# Patient Record
Sex: MALE | Race: WHITE | Employment: FULL TIME | URBAN - NONMETROPOLITAN AREA
[De-identification: names, ages, dates, MRNs, and addresses within clinical notes are randomized per-mention and may not be internally consistent; named-entity substitution may affect disease eponyms.]

---

## 2018-07-20 ENCOUNTER — APPOINTMENT (OUTPATIENT)
Dept: LAB | Facility: MEDICAL CENTER | Age: 38
End: 2018-07-20

## 2018-07-20 ENCOUNTER — OFFICE VISIT (OUTPATIENT)
Dept: FAMILY MEDICINE CLINIC | Facility: CLINIC | Age: 38
End: 2018-07-20
Payer: COMMERCIAL

## 2018-07-20 VITALS
HEIGHT: 74 IN | BODY MASS INDEX: 37.6 KG/M2 | WEIGHT: 293 LBS | DIASTOLIC BLOOD PRESSURE: 80 MMHG | SYSTOLIC BLOOD PRESSURE: 128 MMHG

## 2018-07-20 DIAGNOSIS — G47.39 OTHER SLEEP APNEA: ICD-10-CM

## 2018-07-20 DIAGNOSIS — Z00.00 HEALTHCARE MAINTENANCE: Primary | ICD-10-CM

## 2018-07-20 DIAGNOSIS — Z13.6 SCREENING FOR CARDIOVASCULAR CONDITION: ICD-10-CM

## 2018-07-20 DIAGNOSIS — I83.813 VARICOSE VEINS OF BOTH LOWER EXTREMITIES WITH PAIN: ICD-10-CM

## 2018-07-20 PROBLEM — R06.83 SNORES: Status: ACTIVE | Noted: 2018-07-20

## 2018-07-20 PROBLEM — R06.83 SNORES: Status: RESOLVED | Noted: 2018-07-20 | Resolved: 2018-07-20

## 2018-07-20 LAB
ALBUMIN SERPL BCP-MCNC: 4 G/DL (ref 3.5–5)
ALP SERPL-CCNC: 67 U/L (ref 46–116)
ALT SERPL W P-5'-P-CCNC: 55 U/L (ref 12–78)
ANION GAP SERPL CALCULATED.3IONS-SCNC: 8 MMOL/L (ref 4–13)
AST SERPL W P-5'-P-CCNC: 26 U/L (ref 5–45)
BASOPHILS # BLD AUTO: 0.04 THOUSANDS/ΜL (ref 0–0.1)
BASOPHILS NFR BLD AUTO: 1 % (ref 0–1)
BILIRUB SERPL-MCNC: 0.75 MG/DL (ref 0.2–1)
BUN SERPL-MCNC: 17 MG/DL (ref 5–25)
CALCIUM SERPL-MCNC: 9.1 MG/DL (ref 8.3–10.1)
CHLORIDE SERPL-SCNC: 104 MMOL/L (ref 100–108)
CHOLEST SERPL-MCNC: 194 MG/DL (ref 50–200)
CO2 SERPL-SCNC: 25 MMOL/L (ref 21–32)
CREAT SERPL-MCNC: 1.06 MG/DL (ref 0.6–1.3)
EOSINOPHIL # BLD AUTO: 0.13 THOUSAND/ΜL (ref 0–0.61)
EOSINOPHIL NFR BLD AUTO: 2 % (ref 0–6)
ERYTHROCYTE [DISTWIDTH] IN BLOOD BY AUTOMATED COUNT: 12.7 % (ref 11.6–15.1)
GFR SERPL CREATININE-BSD FRML MDRD: 89 ML/MIN/1.73SQ M
GLUCOSE P FAST SERPL-MCNC: 127 MG/DL (ref 65–99)
HCT VFR BLD AUTO: 46.9 % (ref 36.5–49.3)
HDLC SERPL-MCNC: 43 MG/DL (ref 40–60)
HGB BLD-MCNC: 15.4 G/DL (ref 12–17)
IMM GRANULOCYTES # BLD AUTO: 0.02 THOUSAND/UL (ref 0–0.2)
IMM GRANULOCYTES NFR BLD AUTO: 0 % (ref 0–2)
LDLC SERPL CALC-MCNC: 114 MG/DL (ref 0–100)
LYMPHOCYTES # BLD AUTO: 2.99 THOUSANDS/ΜL (ref 0.6–4.47)
LYMPHOCYTES NFR BLD AUTO: 36 % (ref 14–44)
MCH RBC QN AUTO: 30.8 PG (ref 26.8–34.3)
MCHC RBC AUTO-ENTMCNC: 32.8 G/DL (ref 31.4–37.4)
MCV RBC AUTO: 94 FL (ref 82–98)
MONOCYTES # BLD AUTO: 0.69 THOUSAND/ΜL (ref 0.17–1.22)
MONOCYTES NFR BLD AUTO: 8 % (ref 4–12)
NEUTROPHILS # BLD AUTO: 4.45 THOUSANDS/ΜL (ref 1.85–7.62)
NEUTS SEG NFR BLD AUTO: 53 % (ref 43–75)
NONHDLC SERPL-MCNC: 151 MG/DL
NRBC BLD AUTO-RTO: 0 /100 WBCS
PLATELET # BLD AUTO: 210 THOUSANDS/UL (ref 149–390)
PMV BLD AUTO: 10.2 FL (ref 8.9–12.7)
POTASSIUM SERPL-SCNC: 4.2 MMOL/L (ref 3.5–5.3)
PROT SERPL-MCNC: 7.6 G/DL (ref 6.4–8.2)
RBC # BLD AUTO: 5 MILLION/UL (ref 3.88–5.62)
SODIUM SERPL-SCNC: 137 MMOL/L (ref 136–145)
TRIGL SERPL-MCNC: 184 MG/DL
WBC # BLD AUTO: 8.32 THOUSAND/UL (ref 4.31–10.16)

## 2018-07-20 PROCEDURE — 99385 PREV VISIT NEW AGE 18-39: CPT | Performed by: FAMILY MEDICINE

## 2018-07-20 PROCEDURE — 80053 COMPREHEN METABOLIC PANEL: CPT

## 2018-07-20 PROCEDURE — 36415 COLL VENOUS BLD VENIPUNCTURE: CPT

## 2018-07-20 PROCEDURE — 80061 LIPID PANEL: CPT

## 2018-07-20 PROCEDURE — 85025 COMPLETE CBC W/AUTO DIFF WBC: CPT

## 2018-07-20 NOTE — PATIENT INSTRUCTIONS
Thank you for enrolling in [facility]  Please follow the instructions below to securely access your online medical record  [employer] allows you to send messages to your doctor, view your test results, renew your prescriptions, schedule appointments, and more  [facility] uses Single Sign on (SSO) Technology for you to log in and access our [address]  [employer], including [employer]  No more remembering multiple user names and passwords! We are going to guide you through, step by step, to help you set up your [address] [place] [address] account which will provide access to your [employer] account  How Do I Sign Up? 1  In your Internet browser, go to [email or web address]       2  Click on the [facility] patient account and then click Dont have an                 Account? Create one now      3  Enter your demographic information and chose a user name (email address) and password  Think of one that is secure and easy to remember  Enter a Referral code if you have one (this is not your MyChart code ) Accept the Terms and Conditions and the Privacy Policy  4  Select your security questions that you will use to reset your password should you forget it  Click Submit  5  Enter your [employer] Activation Code exactly as it appears below  You will not need to use this code after you have completed the sign-up process  If you do not sign up before the expiration date, you must request a new code  [employer] Activation Code: [email or web address]  Expires: 8/3/2018  9:39 AM    6  Confirm your email address  An email confirmation was sent to you  Please open that email and click Confirm your Email   You should then be redirected to our [employer] [place] [facility] Single sign on page, where you will log on with the user name and password you created! Proceed to the [employer] Icon to view your personal health information          Additional Information  If you have questions, you can e-mail patient  [email or web address] or call [phone] to talk to our customer support staff  Remember, MyChart is NOT to be used for urgent needs  For medical emergencies, dial 911

## 2018-07-20 NOTE — PROGRESS NOTES
Assessment/Plan: For his varicose veins, Rx for compression stockings  We will get blood work on him  We will order home sleep study on him  Follow-up in 4-6 months or p r n  No problem-specific Assessment & Plan notes found for this encounter  Diagnoses and all orders for this visit:    Healthcare maintenance    Other sleep apnea  -     Home Study; Future    Varicose veins of both lower extremities with pain  -     Compression Stocking    Screening for cardiovascular condition  -     CBC and differential; Future  -     Comprehensive metabolic panel; Future  -     Lipid panel; Future          Subjective:      Patient ID: Heike Maya is a 41 y o  male  New patient  Here today for well visit  Patient is on no chronic medications  Denies any chest pain or shortness of breath  Couple concerns is that 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, 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  Because of this he has varicose veins that are painful at times  Also his family states that he snores and stops breathing when he sleeps  The following portions of the patient's history were reviewed and updated as appropriate:   He  has no past medical history on file  He   Patient Active Problem List    Diagnosis Date Noted    Varicose veins of both lower extremities with pain 07/20/2018    Other sleep apnea 07/20/2018     He  has a past surgical history that includes Knee surgery  His family history includes No Known Problems in his father and mother  He  reports that he has been smoking  He has never used smokeless tobacco  He reports that he does not drink alcohol or use drugs  No current outpatient prescriptions on file  No current facility-administered medications for this visit  No current outpatient prescriptions on file prior to visit  No current facility-administered medications on file prior to visit  He has No Known Allergies       Review of Systems   Constitutional: Negative      Respiratory: Negative  Cardiovascular: Negative  Gastrointestinal: Negative  Genitourinary: Negative  Musculoskeletal:        Varicose veins   Psychiatric/Behavioral: Positive for sleep disturbance (Snoring, witnessed apnea)  Objective:      /80   Ht 6' 1 75" (1 873 m)   Wt 133 kg (293 lb)   BMI 37 87 kg/m²          Physical Exam   Constitutional: He is oriented to person, place, and time  He appears well-developed and well-nourished  No distress  Cardiovascular: Normal rate, regular rhythm and normal heart sounds  Exam reveals no gallop and no friction rub  No murmur heard  Pulmonary/Chest: Effort normal and breath sounds normal  No respiratory distress  He has no wheezes  He has no rales  Musculoskeletal: He exhibits no edema  Positive varicose veins bilateral lower extremities  Neurological: He is alert and oriented to person, place, and time  Skin: He is not diaphoretic  Psychiatric: He has a normal mood and affect  His behavior is normal  Judgment and thought content normal    Vitals reviewed

## 2018-07-23 DIAGNOSIS — R73.09 ELEVATED GLUCOSE: Primary | ICD-10-CM

## 2018-07-24 ENCOUNTER — APPOINTMENT (OUTPATIENT)
Dept: LAB | Facility: MEDICAL CENTER | Age: 38
End: 2018-07-24
Payer: COMMERCIAL

## 2018-07-24 LAB
EST. AVERAGE GLUCOSE BLD GHB EST-MCNC: 140 MG/DL
HBA1C MFR BLD: 6.5 % (ref 4.2–6.3)

## 2018-07-24 PROCEDURE — 83036 HEMOGLOBIN GLYCOSYLATED A1C: CPT | Performed by: FAMILY MEDICINE

## 2018-07-24 PROCEDURE — 36415 COLL VENOUS BLD VENIPUNCTURE: CPT | Performed by: FAMILY MEDICINE

## 2018-07-25 ENCOUNTER — TELEPHONE (OUTPATIENT)
Dept: SLEEP CENTER | Facility: HOSPITAL | Age: 38
End: 2018-07-25

## 2018-07-25 DIAGNOSIS — E11.9 TYPE 2 DIABETES MELLITUS WITHOUT COMPLICATION, WITHOUT LONG-TERM CURRENT USE OF INSULIN (HCC): Primary | ICD-10-CM

## 2018-07-28 ENCOUNTER — HOSPITAL ENCOUNTER (OUTPATIENT)
Dept: SLEEP CENTER | Facility: HOSPITAL | Age: 38
Discharge: HOME | End: 2018-07-28
Payer: COMMERCIAL

## 2018-07-28 DIAGNOSIS — G47.39 OTHER SLEEP APNEA: ICD-10-CM

## 2018-07-28 PROCEDURE — G0399 HOME SLEEP TEST/TYPE 3 PORTA: HCPCS

## 2018-08-02 DIAGNOSIS — G47.33 OBSTRUCTIVE SLEEP APNEA SYNDROME: Primary | ICD-10-CM

## 2018-08-09 ENCOUNTER — OFFICE VISIT (OUTPATIENT)
Dept: FAMILY MEDICINE CLINIC | Facility: CLINIC | Age: 38
End: 2018-08-09
Payer: COMMERCIAL

## 2018-08-09 ENCOUNTER — TELEPHONE (OUTPATIENT)
Dept: FAMILY MEDICINE CLINIC | Facility: CLINIC | Age: 38
End: 2018-08-09

## 2018-08-09 ENCOUNTER — TELEPHONE (OUTPATIENT)
Dept: SLEEP CENTER | Facility: HOSPITAL | Age: 38
End: 2018-08-09

## 2018-08-09 VITALS
BODY MASS INDEX: 37.29 KG/M2 | DIASTOLIC BLOOD PRESSURE: 80 MMHG | WEIGHT: 290.6 LBS | SYSTOLIC BLOOD PRESSURE: 124 MMHG | HEIGHT: 74 IN

## 2018-08-09 DIAGNOSIS — E11.9 TYPE 2 DIABETES MELLITUS WITHOUT COMPLICATION, WITHOUT LONG-TERM CURRENT USE OF INSULIN (HCC): ICD-10-CM

## 2018-08-09 DIAGNOSIS — G47.33 OBSTRUCTIVE SLEEP APNEA SYNDROME: Primary | ICD-10-CM

## 2018-08-09 PROCEDURE — 99213 OFFICE O/P EST LOW 20 MIN: CPT | Performed by: FAMILY MEDICINE

## 2018-08-09 NOTE — TELEPHONE ENCOUNTER
Pt states he spoke w/ central scheduling and they told him since he already has a diagnosis of sleep apnea he does not need to have an appt w/ Dr Phillip Fritz to get the cpap  He will, however need to have a sleep study test done in the hospital and you can order that

## 2018-08-09 NOTE — PROGRESS NOTES
Assessment/Plan:  Patient will call the sleep lab today to get in for his CPAP titration study  Patient is determined to diet and exercise, he does not wish to go on any cholesterol pill her ACE-inhibitor at this time  He will start the metformin  We will see him again in 3 months, he will get a hemoglobin A1c before that visit  No problem-specific Assessment & Plan notes found for this encounter  Diagnoses and all orders for this visit:    Obstructive sleep apnea syndrome    Type 2 diabetes mellitus without complication, without long-term current use of insulin (HCC)  -     Hemoglobin A1C; Future          Subjective:      Patient ID: Judy Kwok is a 41 y o  male  Patient is here today for follow-up  Patient had his home sleep study which showed severe sleep apnea with severe hypoxia  He needs to schedule the CPAP titration study  Patient's blood work showed his hemoglobin A1c was 6 5  I prescribed metformin, but patient has not picked up yet  He is willing to take it  He is very determined once he gets his sleep apnea under control, that he will start exercising and dieting more  He has lost 3 lb since his last visit  He denies any chest pain or shortness of breath  Diabetes   He presents for his follow-up diabetic visit  He has type 2 diabetes mellitus  His disease course has been improving  There are no hypoglycemic associated symptoms  There are no diabetic associated symptoms  There are no hypoglycemic complications  There are no diabetic complications  Risk factors for coronary artery disease include male sex, obesity, diabetes mellitus and dyslipidemia  Current diabetic treatment includes diet  He is compliant with treatment most of the time  His weight is decreasing steadily  He is following a generally healthy diet  When asked about meal planning, he reported none  He rarely participates in exercise  Home blood sugar record trend: Unknown   An ACE inhibitor/angiotensin II receptor blocker is not being taken  He does not see a podiatrist Eye exam is not current  The following portions of the patient's history were reviewed and updated as appropriate:   He  has no past medical history on file  He   Patient Active Problem List    Diagnosis Date Noted    Type 2 diabetes mellitus without complication, without long-term current use of insulin ([facility] [address] ) 07/25/2018    Varicose veins of both lower extremities with pain 07/20/2018    Obstructive sleep apnea syndrome 07/20/2018     He  has a past surgical history that includes Knee surgery  His family history includes No Known Problems in his father and mother  He  reports that he has been smoking  He has never used smokeless tobacco  He reports that he does not drink alcohol or use drugs  Current Outpatient Prescriptions   Medication Sig Dispense Refill    metFORMIN (GLUCOPHAGE) 500 mg tablet Take 1 tablet (500 mg total) by mouth daily 30 tablet 5     No current facility-administered medications for this visit  Current Outpatient Prescriptions on File Prior to Visit   Medication Sig    metFORMIN (GLUCOPHAGE) 500 mg tablet Take 1 tablet (500 mg total) by mouth daily     No current facility-administered medications on file prior to visit  He has No Known Allergies       Review of Systems   Constitutional: Negative  Respiratory: Negative  Cardiovascular: Negative  Gastrointestinal: Negative  Genitourinary: Negative  Objective:      /80   Ht 6' 1 75" (1 873 m)   Wt 132 kg (290 lb 9 6 oz)   BMI 37 56 kg/m²          Physical Exam   Constitutional: He is oriented to person, place, and time  He appears well-developed and well-nourished  No distress  Cardiovascular: Normal rate, regular rhythm and normal heart sounds  Exam reveals no gallop and no friction rub  Pulses are no weak pulses  No murmur heard  Pulses:       Dorsalis pedis pulses are 2+ on the right side, and 2+ on the left side  Posterior tibial pulses are 2+ on the right side, and 2+ on the left side  Pulmonary/Chest: Effort normal and breath sounds normal  No respiratory distress  He has no wheezes  He has no rales  Musculoskeletal: He exhibits no edema  Feet:   Right Foot:   Skin Integrity: Negative for ulcer, skin breakdown, erythema, warmth, callus or dry skin  Left Foot:   Skin Integrity: Negative for ulcer, skin breakdown, erythema, warmth, callus or dry skin  Neurological: He is alert and oriented to person, place, and time  Skin: He is not diaphoretic  Psychiatric: He has a normal mood and affect  His behavior is normal  Judgment and thought content normal    Vitals reviewed  Patient's shoes and socks removed  Right Foot/Ankle   Right Foot Inspection  Skin Exam: skin normal and skin intact no dry skin, no warmth, no callus, no erythema, no maceration, no abnormal color, no pre-ulcer, no ulcer and no callus                            Sensory       Monofilament testing: intact  Vascular    The right DP pulse is 2+  The right PT pulse is 2+  Left Foot/Ankle  Left Foot Inspection  Skin Exam: skin normal and skin intactno dry skin, no warmth, no erythema, no maceration, normal color, no pre-ulcer, no ulcer and no callus                                         Sensory       Monofilament: intact  Vascular    The left DP pulse is 2+  The left PT pulse is 2+  Assign Risk Category:  No deformity present; No loss of protective sensation;  No weak pulses       Risk: 0

## 2018-08-09 NOTE — TELEPHONE ENCOUNTER
I already have ordered the CPAP titration study  That is the script I gave him today  He may call and schedule it    If he needs help, have him call us back

## 2018-08-13 ENCOUNTER — OFFICE VISIT (OUTPATIENT)
Dept: URGENT CARE | Facility: CLINIC | Age: 38
End: 2018-08-13
Payer: COMMERCIAL

## 2018-08-13 ENCOUNTER — TELEPHONE (OUTPATIENT)
Dept: SLEEP CENTER | Facility: HOSPITAL | Age: 38
End: 2018-08-13

## 2018-08-13 VITALS
BODY MASS INDEX: 38.01 KG/M2 | RESPIRATION RATE: 18 BRPM | SYSTOLIC BLOOD PRESSURE: 124 MMHG | TEMPERATURE: 98.1 F | HEART RATE: 88 BPM | DIASTOLIC BLOOD PRESSURE: 84 MMHG | WEIGHT: 286.8 LBS | OXYGEN SATURATION: 100 % | HEIGHT: 73 IN

## 2018-08-13 DIAGNOSIS — H60.501 ACUTE OTITIS EXTERNA OF RIGHT EAR, UNSPECIFIED TYPE: Primary | ICD-10-CM

## 2018-08-13 DIAGNOSIS — H66.91 RIGHT OTITIS MEDIA, UNSPECIFIED OTITIS MEDIA TYPE: ICD-10-CM

## 2018-08-13 PROCEDURE — G0382 LEV 3 HOSP TYPE B ED VISIT: HCPCS | Performed by: PHYSICIAN ASSISTANT

## 2018-08-13 PROCEDURE — S9083 URGENT CARE CENTER GLOBAL: HCPCS | Performed by: PHYSICIAN ASSISTANT

## 2018-08-13 RX ORDER — AMOXICILLIN 875 MG/1
875 TABLET, COATED ORAL 2 TIMES DAILY
Qty: 20 TABLET | Refills: 0 | Status: SHIPPED | OUTPATIENT
Start: 2018-08-13 | End: 2018-08-23

## 2018-08-13 RX ORDER — OFLOXACIN 3 MG/ML
10 SOLUTION AURICULAR (OTIC) DAILY
Qty: 5 ML | Refills: 0 | Status: SHIPPED | OUTPATIENT
Start: 2018-08-13 | End: 2018-09-17

## 2018-08-13 NOTE — PROGRESS NOTES
[address]  (office) [phone]  (fax) [phone]        NAME: Vazquez Santana is a 41 y o  male  : 1980    MRN: [id number]  DATE: 2018  TIME: 12:17 PM    Assessment and Plan   Acute otitis externa of right ear, unspecified type [H60 501]  1  Acute otitis externa of right ear, unspecified type  ofloxacin (FLOXIN) 0 3 % otic solution   2  Right otitis media, unspecified otitis media type  amoxicillin (AMOXIL) 875 mg tablet       Patient Instructions   Ear wick placed  Instructed patient on proper administration of antibiotic drops  Within 48 hours ear wick should be removed and may come out on its own  Patient did verbalize understanding  Please take the antibiotic as prescribed and finish the entire prescription  I recommend that the patient takes an over the counter probiotic or eats yogurt with live cultures in it [place]) to keep good bacteria in the gut and help prevent diarrhea  If not improving over the next 3-5 days, follow up with PCP  To present to the ER if symptoms worsen  Chief Complaint     Chief Complaint   Patient presents with    Earache     severe pain in right ear x3 days         History of Present Illness   Vazquez Santana presents to the clinic c/o    Earache    There is pain in the right ear  This is a new problem  The current episode started in the past 7 days  The problem occurs constantly  The problem has been gradually worsening  There has been no fever  The pain is moderate  Pertinent negatives include no abdominal pain, coughing, diarrhea, ear discharge, headaches, hearing loss, neck pain, rash, rhinorrhea, sore throat or vomiting  He has tried nothing for the symptoms  The treatment provided no relief  Review of Systems   Review of Systems   Constitutional: Negative for activity change, appetite change, chills, diaphoresis, fatigue and fever  HENT: Positive for ear pain   Negative for congestion, ear discharge, facial swelling, hearing loss, rhinorrhea, sinus pain, sinus pressure, sneezing and sore throat  Eyes: Negative for photophobia, pain, discharge, redness, itching and visual disturbance  Respiratory: Negative for apnea, cough, chest tightness, shortness of breath and wheezing  Cardiovascular: Negative for chest pain  Gastrointestinal: Negative for abdominal distention, abdominal pain, anal bleeding, blood in stool, constipation, diarrhea, nausea and vomiting  Genitourinary: Negative for dysuria, flank pain, frequency, hematuria and urgency  Musculoskeletal: Negative for arthralgias, back pain, gait problem, joint swelling, myalgias, neck pain and neck stiffness  Skin: Negative for color change, rash and wound  Allergic/Immunologic: Negative for immunocompromised state  Neurological: Negative for dizziness, facial asymmetry and headaches  Hematological: Negative for adenopathy  Psychiatric/Behavioral: Negative for confusion and decreased concentration  Current Medications     Long-Term Prescriptions   Medication Sig Dispense Refill    metFORMIN (GLUCOPHAGE) 500 mg tablet Take 1 tablet (500 mg total) by mouth daily 30 tablet 5       Current Allergies     Allergies as of 08/13/2018    (No Known Allergies)            The following portions of the patient's history were reviewed and updated as appropriate: allergies, current medications, past family history, past medical history, past social history, past surgical history and problem list   Past Medical History:   Diagnosis Date    Diabetes mellitus ([address] )     Sleep apnea      Past Surgical History:   Procedure Laterality Date    KNEE SURGERY       Social History     Social History    Marital status: /Civil Union     Spouse name: N/A    Number of children: N/A    Years of education: N/A     Occupational History    Not on file       Social History Main Topics    Smoking status: Current Every Day Smoker Packs/day: 0 25     Types: Cigarettes    Smokeless tobacco: Never Used    Alcohol use No    Drug use: No    Sexual activity: Not on file     Other Topics Concern    Not on file     Social History Narrative    No narrative on file       Objective   /84   Pulse 88   Temp 98 1 °F (36 7 °C) (Tympanic)   Resp 18   Ht 6' 1" (1 854 m)   Wt 130 kg (286 lb 12 8 oz)   SpO2 100%   BMI 37 84 kg/m²      Physical Exam     Physical Exam   Constitutional: He is oriented to person, place, and time  He appears well-developed and well-nourished  No distress  HENT:   Head: Normocephalic and atraumatic  Right Ear: There is swelling (external canal) and tenderness (tenderness to palpation of tragus and tugging of ext ear)  Tympanic membrane is erythematous (very minimal visualization due to swelling of ear)  Left Ear: Tympanic membrane and external ear normal    Nose: Nose normal    Mouth/Throat: Oropharynx is clear and moist  No oropharyngeal exudate  Ear wick placed in right external canal, small amount of normal saline placed to expand wick, patient instructed to place antibiotic drops onto wick ASAP   Eyes: Conjunctivae and EOM are normal  Pupils are equal, round, and reactive to light  Right eye exhibits no discharge  Left eye exhibits no discharge  No scleral icterus  Neck: Normal range of motion  Neck supple  No JVD present  No tracheal deviation present  No thyromegaly present  Cardiovascular: Normal rate, regular rhythm and normal heart sounds  Exam reveals no gallop and no friction rub  No murmur heard  Pulmonary/Chest: Effort normal and breath sounds normal  No stridor  No respiratory distress  He has no wheezes  He has no rales  He exhibits no tenderness  Abdominal: Soft  Bowel sounds are normal  He exhibits no distension and no mass  There is no tenderness  There is no rebound and no guarding  Musculoskeletal: Normal range of motion  He exhibits no tenderness or deformity  Lymphadenopathy:     He has no cervical adenopathy  Neurological: He is alert and oriented to person, place, and time  He has normal reflexes  Coordination normal    Skin: Skin is warm and dry  No rash noted  He is not diaphoretic  No erythema  No pallor  Psychiatric: He has a normal mood and affect  His behavior is normal  Judgment and thought content normal    Nursing note and vitals reviewed        Rachana Owen PA-C

## 2018-08-20 ENCOUNTER — HOSPITAL ENCOUNTER (OUTPATIENT)
Dept: SLEEP CENTER | Facility: HOSPITAL | Age: 38
Discharge: HOME | End: 2018-08-20
Payer: COMMERCIAL

## 2018-08-20 DIAGNOSIS — G47.33 OBSTRUCTIVE SLEEP APNEA SYNDROME: ICD-10-CM

## 2018-08-20 PROCEDURE — 95811 POLYSOM 6/>YRS CPAP 4/> PARM: CPT

## 2018-08-21 DIAGNOSIS — G47.33 OBSTRUCTIVE SLEEP APNEA SYNDROME: Primary | ICD-10-CM

## 2018-08-21 NOTE — PROGRESS NOTES
Sleep Study Documentation    Pre-Sleep Study       Sleep testing procedure explained to patient:YES    Patient napped prior to study:NO    Caffeine:Dayshift worker after 12PM   Caffeine use:NO    Alcohol:Dayshift workers after 5PM: Alcohol use:NO    Typical day for patient:YES       Study Documentation  Treatment   Optimal PAP pressure: 17  Leak:Medium  Snore:Eliminated  REM Obtained:yes  Supplemental O2: no    Minimum SaO2 79%  Baseline SaO2 94%  PAP mask tried (list all) Eson, Airfit F20  PAP mask choice (final)Airfit F20   PAP mask type:full face  PAP pressure at which snoring was eliminated 15 cm   Minimum SaO2 at final PAP pressure 91%  Mode of Therapy:CPAP  ETCO2:No  CPAP changed to BiPAP:No    Mode of Therapy:CPAP    EKG abnormalities: no     EEG abnormalities: no    Study Terminated:no    Patient classification: employed       Post-Sleep Study    Medication used at bedtime or during sleep study:NO    Patient reports time it took to fall asleep:20 to 30 minutes    Patient reports waking up during study:1 to 2 times  Patient reports returning to sleep without difficulty  Patient reports sleeping 4 to 6 hours without dreaming  Patient reports sleep during study:better than usual    Patient rated sleepiness: Not sleepy or tired    PAP treatment:yes: Post PAP treatment patient reports feeling unchanged and would wear PAP mask at home

## 2018-08-23 DIAGNOSIS — G47.33 OSA (OBSTRUCTIVE SLEEP APNEA): Primary | ICD-10-CM

## 2018-08-30 ENCOUNTER — OFFICE VISIT (OUTPATIENT)
Dept: SLEEP CENTER | Facility: CLINIC | Age: 38
End: 2018-08-30
Payer: COMMERCIAL

## 2018-08-30 VITALS
WEIGHT: 279.8 LBS | HEIGHT: 73 IN | DIASTOLIC BLOOD PRESSURE: 86 MMHG | SYSTOLIC BLOOD PRESSURE: 124 MMHG | BODY MASS INDEX: 37.08 KG/M2 | OXYGEN SATURATION: 98 %

## 2018-08-30 DIAGNOSIS — G47.33 OBSTRUCTIVE SLEEP APNEA SYNDROME: Primary | ICD-10-CM

## 2018-08-30 DIAGNOSIS — G47.10 HYPERSOMNIA: ICD-10-CM

## 2018-08-30 DIAGNOSIS — E66.9 OBESITY (BMI 30-39.9): ICD-10-CM

## 2018-08-30 DIAGNOSIS — N52.9 ERECTILE DYSFUNCTION, UNSPECIFIED ERECTILE DYSFUNCTION TYPE: ICD-10-CM

## 2018-08-30 DIAGNOSIS — E11.9 TYPE 2 DIABETES MELLITUS WITHOUT COMPLICATION, WITHOUT LONG-TERM CURRENT USE OF INSULIN (HCC): ICD-10-CM

## 2018-08-30 DIAGNOSIS — F51.12 INSUFFICIENT SLEEP SYNDROME: ICD-10-CM

## 2018-08-30 DIAGNOSIS — Z72.0 TOBACCO ABUSE: ICD-10-CM

## 2018-08-30 DIAGNOSIS — R45.86 MOOD DISTURBANCE: ICD-10-CM

## 2018-08-30 PROCEDURE — 99244 OFF/OP CNSLTJ NEW/EST MOD 40: CPT | Performed by: INTERNAL MEDICINE

## 2018-08-30 NOTE — PROGRESS NOTES
Review of Systems      Genitourinary need to urinate more than twice a night   Cardiology ankle/leg swelling   Gastrointestinal frequent heartburn/acid reflux   Neurology frequent headaches, muscle weakness, numbness/tingling of an extremity and balance problems   Constitutional fatigue and excessive sweating at night   Integumentary none   Psychiatry anxiety, depression, aggressiveness or irritability and mood change   Musculoskeletal joint pain, muscle aches and leg cramps   Pulmonary shortness of breath with activity and snoring   ENT none   Endocrine excessive thirst and frequent urination   Hematological none

## 2018-08-30 NOTE — PROGRESS NOTES
Consultation - [address]  41 y o  male  TKW:  VK    Physician Requesting Consult: Lane Soliman DO     Reason for Consult : [At your kind request] I saw this patient for initial sleep evaluation today  The patient had both diagnostic and therapeutic sleep studies [and is here to review results and to initiate therapy]  The diagnostic was a home study that confirmed [severe] obstructive sleep apnea:  DARRIN 56/hour  Minimum oxygen saturation 65 %  [and 52% of total sleep time was spent with saturations less than 90%  [Intermittent] snoring was noted  During the subsequent therapeutic study, sleep disordered breathing was [successfully] remediated with PAP at 17 cm H2O  PFSH, Problem List, Medications & Allergies were reviewed in EMR  He  has a past medical history of Diabetes mellitus ([facility] [address] ) and Sleep apnea  He has a current medication list which includes the following prescription(s): metformin and ofloxacin  HPI:  Study was undertaken because of he has history of snoring of at least 5 years duration that is loud enough to disturb his family who also notes he appears to be choking during sleep  He tried elevating he has had but to no avail  Restless Leg Syndrome: reports no suggestive symptoms    Parasomnia activity: no features reported   Sleep Routine:   Is irregular because of his work schedule Typical Bedtime:  Between 11:00 p m  and 2:00 a m  Gets OOB:   7 - 9 a m    [TIB:   No more than 7-8 hrs]  Sleep latency: <  30 minutes Sleep Interruptions: 2-4 x/night because of nocturia but is able to fall back asleep  [Patient's Estimated TST:4 hrs] Awakens: spontaneously feeling unrefreshed   He admitted Excessive daytime drowsiness:  He yawns excessively and feels like napping but does not have the opportunity [place] Sleepiness Scale rated at Total score: 8 /24  Habits: reports that he has been smoking Cigarettes    He has been smoking about 0 25 packs per day  He has never used smokeless tobacco , he uses moderate amount of alcohol, [ reports that he does not use drugs  ],Caffeine use: limited[ ], Exercise routine: none[ ]  Family History: [Negative for sleep disturbance ]  ROS: reviewed & as attached  [Significant for approximately 21 lb intentional weight loss in the past 1 month  He reported symptoms suggestive of depression and rated himself at 12 on the PHQ-9 scale  He is also concerned about erectile dysfunction ]     EXAM:  key  [x] system is Normal  [] see notes  VITALS     []  /86   Ht 6' 1" (1 854 m)   Wt 127 kg (279 lb 12 8 oz)   SpO2 98%   BMI 36 92 kg/m²     GENERAL[x]  [Well] groomed male, well appearing, [at] his stated age, in [no apparent] distress  PSYCH     [x]  Alert and cooperative  Speech is clear & coherant  [Mental state appears normal ] Judgement & Insight [good]   EXTREM/  SKIN         [x]  [No] pedal edema  Skin is warm and dry  Color and hydration are good  HEAD       [x]     Craniofacial anatomy: normal  EOMI  TMJ & Sinuses are normal    Neck         []  Neck Circumference: 47 5 cm, appears thick and muscular; [no] abnormal masses [or] Lymhadenopathy  Thyroid is [normal]  Trachea is [central]  Nasal        []  Airway is patent Septum is [central], Mucous membranes appear [normal]  Turbinates are [normal ] There is [no] rhinorrhea  Oral          []    Airway       crowded Modified Mallampati class III (soft and hard palate and base of uvula visible)  Palate:  redundant soft palate and Uvular HypertrophyThere is [no] tonsillar hypertrophy  Teeth normal      CVS         [x]  Heart sounds are [regular], [No] murmur[s]  RESP       [x]  Effort is [normal]  Air entry [good] [bilaterally]  [No]wheezes  [No]rales  ABD         [x]  obese, soft & non-tender  CNS         [x]  Grossly intact  Normal gait  Rombergs [Negative]      MSK         [x]  Muscle bulk, tone and power  [normal] IMPRESSION:   1  Obstructive sleep apnea syndrome     2  Insufficient sleep syndrome     3  Hypersomnia     4  Tobacco abuse     5  Type 2 diabetes mellitus without complication, without long-term current use of insulin ([address] )     6  Mood disturbance (HCC)     7  Obesity (BMI 30-39 9)     8  Erectile dysfunction, unspecified erectile dysfunction type          PLAN:   1  I reviewed results of the Sleep studies with the patient  2  With respect to above conditions, I counseled on pathophysiology, diagnosis, treatment options, risks and benefits; inter-relationship and effects on symptoms and comorbidities; risks of no treatment; costs and insurance aspects  3  Patient elected positive airway pressure therapy and care coordinated with the DME provider for set up in clinic today  Pressure settin cm H2O  4  Need for compliance with therapy and weight reduction were emphasized  5   I also advised on sleep hygiene specifically allowing sufficient opportunity for sleep and on smoking cessation  6  Follow-up to be scheduled in 2 months to monitor compliance, progress and to adjust therapy  Thank you for allowing me to participate in the care of this patient  I will keep you apprised of developments       Sincerely,     Authenticated electronically by Cortes Joe MD   on    Board Certified Specialist

## 2018-08-30 NOTE — PATIENT INSTRUCTIONS
What is DAISY? Obstructive sleep apnea is a common and serious sleep disorder that causes you to stop breathing during sleep  The airway repeatedly becomes blocked, limiting the amount of air that reaches your lungs  When this happens, you may snore loudly or making choking noises as you try to breathe  Your brain and body becomes oxygen deprived and you may wake up  This may happen a few times a night, or in more severe cases, several hundred times a night  Sleep apnea can make you wake up in the morning feeling tired or unrefreshed even though you have had a full night of sleep  During the day, you may feel fatigued, have difficulty concentrating or you may even unintentionally fall asleep  This is because your body is waking up numerous times throughout the night, even though you might not be conscious of each awakening  The lack of oxygen your body receives can have negative long-term consequences for your health  This includes:  High blood pressure  Heart disease  Irregular heart rhythms  Stroke  Pre-diabetes and diabetes  Depression    Testing  An objective evaluation of your sleep may be needed before your board certified sleep physician can make a diagnosis  Options include:   In-lab overnight sleep study  This type of sleep study requires you to stay overnight at a sleep center, in a bed that may resemble a hotel room  You will sleep with sensors hooked up to various parts of your body  These sensors record your brain waves, heartbeat, breathing and movement  An overnight sleep study also provides your doctor with the most complete information about your sleep  Learn more about an overnight sleep study      Home sleep apnea test  Some patients with high risk factors for obstructive sleep apnea and no other medical disorders may be candidates for a home sleep apnea test  The testing equipment differs in that it is less complicated than what is used in an overnight sleep study   As such, does not give all the data an in-lab will and if negative, may not mean you do not have the problem  Treatment for sleep apnea  includes using a continuous positive airway pressure (CPAP) machine to keep your airway open during sleep  A mask is placed over your nose and mouth, or just your nose  The mask is hooked to the CPAP machine that blows a gentle stream of air into the mask when you breathe  This helps keep your airway open so you can breathe more regularly  Extra oxygen may be given to you through the machine  You may be given a mouth device  It looks like a mouth guard or dental retainer and stops your tongue and mouth tissues from blocking your throat while you sleep  Surgery may be needed to remove extra tissues that block your mouth, throat, or nose  Manage sleep apnea:   Do not smoke  Nicotine and other chemicals in cigarettes and cigars can cause lung damage  Ask your healthcare provider for information if you currently smoke and need help to quit  E-cigarettes or smokeless tobacco still contain nicotine  Talk to your healthcare provider before you use these products  Do not drink alcohol or take sedative medicine before you go to sleep  Alcohol and sedatives can relax the muscles and tissues around your throat  This can block the airflow to your lungs  Maintain a healthy weight  Excess tissue around your throat may restrict your breathing  Ask your healthcare provider for information if you need to lose weight  Sleep on your side or use pillows designed to prevent sleep apnea  This prevents your tongue or other tissues from blocking your throat  You can also raise the head of your bed  Driving Safety  Refrain from driving when drowsy  Follow up with your healthcare provider as directed:  Write down your questions so you remember to ask them during your visits  Go to AASM website for more information: Sleepeducation  org     What is DAISY?    Obstructive sleep apnea is a common and serious sleep disorder that causes you to stop breathing during sleep  The airway repeatedly becomes blocked, limiting the amount of air that reaches your lungs  When this happens, you may snore loudly or making choking noises as you try to breathe  Your brain and body becomes oxygen deprived and you may wake up  This may happen a few times a night, or in more severe cases, several hundred times a night  Sleep apnea can make you wake up in the morning feeling tired or unrefreshed even though you have had a full night of sleep  During the day, you may feel fatigued, have difficulty concentrating or you may even unintentionally fall asleep  This is because your body is waking up numerous times throughout the night, even though you might not be conscious of each awakening  The lack of oxygen your body receives can have negative long-term consequences for your health  This includes:  High blood pressure  Heart disease  Irregular heart rhythms  Stroke  Pre-diabetes and diabetes  Depression    Testing  An objective evaluation of your sleep may be needed before your board certified sleep physician can make a diagnosis  Options include:   In-lab overnight sleep study  This type of sleep study requires you to stay overnight at a sleep center, in a bed that may resemble a hotel room  You will sleep with sensors hooked up to various parts of your body  These sensors record your brain waves, heartbeat, breathing and movement  An overnight sleep study also provides your doctor with the most complete information about your sleep  Learn more about an overnight sleep study      Home sleep apnea test  Some patients with high risk factors for obstructive sleep apnea and no other medical disorders may be candidates for a home sleep apnea test  The testing equipment differs in that it is less complicated than what is used in an overnight sleep study   As such, does not give all the data an in-lab will and if negative, may not mean you do not have the problem  Treatment for sleep apnea  includes using a continuous positive airway pressure (CPAP) machine to keep your airway open during sleep  A mask is placed over your nose and mouth, or just your nose  The mask is hooked to the CPAP machine that blows a gentle stream of air into the mask when you breathe  This helps keep your airway open so you can breathe more regularly  Extra oxygen may be given to you through the machine  You may be given a mouth device  It looks like a mouth guard or dental retainer and stops your tongue and mouth tissues from blocking your throat while you sleep  Surgery may be needed to remove extra tissues that block your mouth, throat, or nose  Manage sleep apnea:   Do not smoke  Nicotine and other chemicals in cigarettes and cigars can cause lung damage  Ask your healthcare provider for information if you currently smoke and need help to quit  E-cigarettes or smokeless tobacco still contain nicotine  Talk to your healthcare provider before you use these products  Do not drink alcohol or take sedative medicine before you go to sleep  Alcohol and sedatives can relax the muscles and tissues around your throat  This can block the airflow to your lungs  Maintain a healthy weight  Excess tissue around your throat may restrict your breathing  Ask your healthcare provider for information if you need to lose weight  Sleep on your side or use pillows designed to prevent sleep apnea  This prevents your tongue or other tissues from blocking your throat  You can also raise the head of your bed  Driving Safety  Refrain from driving when drowsy  Follow up with your healthcare provider as directed:  Write down your questions so you remember to ask them during your visits  Go to AASM website for more information: Sleepeducation  org           What you can do to improve your sleep: (Sleep Hygiene) Basic rules for a good night's sleep    Create a regular sleep schedule    This will help you form a sleep routine  Keep a record of your sleep patterns, and any sleeping problems you have  Bring the record to follow-up visits with healthcare providers  Avoid prolonged use of light-emitting screens before bedtime or watching TV in bed  Avoid forcing sleep  Do not take naps  Naps could make it hard for you to fall asleep at bedtime  Deal with your worries before bedtime  Keep your bedroom cool, quiet, and dark  Turn on white noise, such as a fan, to help you relax  Do not use your bed for any activity that will keep you awake  Do not read, exercise, eat, or watch TV in your bedroom  Get up if you do not fall asleep within 20 minutes  Move to another room and do something relaxing until you become sleepy  Limit caffeine, alcohol, nicotine and food to earlier in the day  Only drink caffeine in the morning  Do not drink alcohol within 6 hours of bedtime  Do not eat a heavy meal right before you go to bed  Avoid smoking, especially in the evening  Exercise regularly  Daily exercise will help you sleep better  Do not exercise within 4 hours of bedtime  Stimulus control therapy rules  1  Go to bed only when sleepy  2  Do not watch television, read, eat, or worry while in bed  Use bed only for sleep and sex  3  Get out of bed if unable to fall asleep within 20 minutes and go to another room  Return to bed only when sleepy  Repeat this step as many times as necessary throughout the night  4  Set an alarm clock to wake up at a fixed time each morning, including weekends  5  Do not take a nap during the day  Data from: [address] \[facility]\"", [address] Nonpharmacologic treatments of insomnia  [facility] 0151; 53:37  Go to AASM website for more information: Sleepeducation  org     Recommended Reading:  Book by authors [address]   No More sleepless nights

## 2018-09-17 ENCOUNTER — OFFICE VISIT (OUTPATIENT)
Dept: FAMILY MEDICINE CLINIC | Facility: CLINIC | Age: 38
End: 2018-09-17
Payer: COMMERCIAL

## 2018-09-17 VITALS
HEIGHT: 73 IN | SYSTOLIC BLOOD PRESSURE: 108 MMHG | BODY MASS INDEX: 38.17 KG/M2 | WEIGHT: 288 LBS | DIASTOLIC BLOOD PRESSURE: 70 MMHG

## 2018-09-17 DIAGNOSIS — M79.661 RIGHT CALF PAIN: Primary | ICD-10-CM

## 2018-09-17 PROCEDURE — 99213 OFFICE O/P EST LOW 20 MIN: CPT | Performed by: FAMILY MEDICINE

## 2018-09-17 PROCEDURE — 3008F BODY MASS INDEX DOCD: CPT | Performed by: FAMILY MEDICINE

## 2018-09-17 NOTE — PROGRESS NOTES
Assessment/Plan: We will get a lower  Extremity venous Doppler  To assess his right leg varicose veins and possible chronic DVT  We will follow up with us in the next couple months for recheck on his diabetes  No problem-specific Assessment & Plan notes found for this encounter  Diagnoses and all orders for this visit:    Right calf pain  -     VAS lower limb venous duplex study, unilateral/limited; Future          Subjective:      Patient ID: Carmen Ontiveros is a 41 y o  male  Patient here today stating for the last 2-3 months has had a hard lump at the top of his right lateral calf  Patient has had a history of varicose veins  The area becomes more tender when he is on his legs  He denies any shortness of breath, no fever chills  Patient also has not started the metformin, he wishes to take care of his diabetes by diet and exercise alone  Leg Pain    There was no injury mechanism  Pain location: Right calf  The quality of the pain is described as aching  The pain is moderate  The pain has been intermittent since onset  Exacerbated by: When he is standing for long period of time  Treatments tried: Compression stockings  The treatment provided moderate relief  The following portions of the patient's history were reviewed and updated as appropriate:   He  has a past medical history of Diabetes mellitus ([facility] [address] ) and Sleep apnea  He   Patient Active Problem List    Diagnosis Date Noted    Insufficient sleep syndrome 08/30/2018    Hypersomnia 08/30/2018    Tobacco abuse 08/30/2018    Mood disturbance ([address] ) 08/30/2018    Obesity (BMI 30-39 9) 08/30/2018    Erectile dysfunction 08/30/2018    Type 2 diabetes mellitus without complication, without long-term current use of insulin ([address] ) 07/25/2018    Varicose veins of both lower extremities with pain 07/20/2018    Obstructive sleep apnea syndrome 07/20/2018     He  has a past surgical history that includes Knee surgery    His family history includes No Known Problems in his father and mother  He  reports that he has been smoking Cigarettes  He has been smoking about 0 25 packs per day  He has never used smokeless tobacco  He reports that he does not drink alcohol or use drugs  No current outpatient prescriptions on file  No current facility-administered medications for this visit  Current Outpatient Prescriptions on File Prior to Visit   Medication Sig    [DISCONTINUED] metFORMIN (GLUCOPHAGE) 500 mg tablet Take 1 tablet (500 mg total) by mouth daily    [DISCONTINUED] ofloxacin (FLOXIN) 0 3 % otic solution Administer 10 drops to the right ear daily     No current facility-administered medications on file prior to visit  He has No Known Allergies       Review of Systems   Constitutional: Negative  Respiratory: Negative  Cardiovascular: Negative  Gastrointestinal: Negative  Genitourinary: Negative  Musculoskeletal:        As per HPI         Objective:      /70 (BP Location: Left arm, Patient Position: Sitting, Cuff Size: Large)   Ht 6' 1" (1 854 m)   Wt 131 kg (288 lb)   BMI 38 00 kg/m²          Physical Exam   Constitutional: He is oriented to person, place, and time  He appears well-developed and well-nourished  No distress  Cardiovascular: Normal rate, regular rhythm and normal heart sounds  Exam reveals no gallop and no friction rub  No murmur heard  Pulmonary/Chest: Effort normal and breath sounds normal  No respiratory distress  He has no wheezes  He has no rales  Musculoskeletal:   Positive for a hard varicose vein on his  Right proximal lateral calf  Only mildly tender to palpation  Neurological: He is alert and oriented to person, place, and time  Skin: He is not diaphoretic  Psychiatric: He has a normal mood and affect  His behavior is normal  Judgment and thought content normal    Vitals reviewed

## 2018-09-20 ENCOUNTER — HOSPITAL ENCOUNTER (OUTPATIENT)
Dept: ULTRASOUND IMAGING | Facility: HOSPITAL | Age: 38
Discharge: HOME | End: 2018-09-20
Payer: COMMERCIAL

## 2018-09-20 DIAGNOSIS — M79.661 RIGHT CALF PAIN: ICD-10-CM

## 2018-09-20 PROCEDURE — 93971 EXTREMITY STUDY: CPT

## 2018-09-20 PROCEDURE — 93971 EXTREMITY STUDY: CPT | Performed by: SURGERY

## 2018-11-01 ENCOUNTER — OFFICE VISIT (OUTPATIENT)
Dept: SLEEP CENTER | Facility: CLINIC | Age: 38
End: 2018-11-01
Payer: COMMERCIAL

## 2018-11-01 VITALS
WEIGHT: 289.8 LBS | DIASTOLIC BLOOD PRESSURE: 80 MMHG | OXYGEN SATURATION: 96 % | HEIGHT: 73 IN | SYSTOLIC BLOOD PRESSURE: 110 MMHG | BODY MASS INDEX: 38.41 KG/M2

## 2018-11-01 DIAGNOSIS — G47.33 OBSTRUCTIVE SLEEP APNEA SYNDROME: Primary | ICD-10-CM

## 2018-11-01 DIAGNOSIS — G47.10 HYPERSOMNIA: ICD-10-CM

## 2018-11-01 DIAGNOSIS — Z72.0 TOBACCO ABUSE: ICD-10-CM

## 2018-11-01 DIAGNOSIS — E11.9 TYPE 2 DIABETES MELLITUS WITHOUT COMPLICATION, WITHOUT LONG-TERM CURRENT USE OF INSULIN (HCC): ICD-10-CM

## 2018-11-01 DIAGNOSIS — E66.9 OBESITY (BMI 30-39.9): ICD-10-CM

## 2018-11-01 DIAGNOSIS — R45.86 MOOD DISTURBANCE: ICD-10-CM

## 2018-11-01 DIAGNOSIS — N52.9 ERECTILE DYSFUNCTION, UNSPECIFIED ERECTILE DYSFUNCTION TYPE: ICD-10-CM

## 2018-11-01 DIAGNOSIS — F51.12 INSUFFICIENT SLEEP SYNDROME: ICD-10-CM

## 2018-11-01 PROCEDURE — 99214 OFFICE O/P EST MOD 30 MIN: CPT | Performed by: INTERNAL MEDICINE

## 2018-11-01 NOTE — PROGRESS NOTES
Review of Systems      Genitourinary need to urinate more than twice a night and difficulty with erection   Cardiology ankle/leg swelling   Gastrointestinal none   Neurology numbness/tingling of an extremity   Constitutional none   Integumentary none   Psychiatry none   Musculoskeletal leg cramps   Pulmonary none   ENT none   Endocrine none   Hematological none

## 2018-11-08 ENCOUNTER — OFFICE VISIT (OUTPATIENT)
Dept: FAMILY MEDICINE CLINIC | Facility: CLINIC | Age: 38
End: 2018-11-08
Payer: COMMERCIAL

## 2018-11-08 VITALS
SYSTOLIC BLOOD PRESSURE: 122 MMHG | HEIGHT: 74 IN | BODY MASS INDEX: 38.63 KG/M2 | WEIGHT: 301 LBS | DIASTOLIC BLOOD PRESSURE: 90 MMHG

## 2018-11-08 DIAGNOSIS — E11.9 TYPE 2 DIABETES MELLITUS WITHOUT COMPLICATION, WITHOUT LONG-TERM CURRENT USE OF INSULIN (HCC): ICD-10-CM

## 2018-11-08 DIAGNOSIS — G89.29 CHRONIC PAIN OF RIGHT KNEE: Primary | ICD-10-CM

## 2018-11-08 DIAGNOSIS — M25.561 CHRONIC PAIN OF RIGHT KNEE: Primary | ICD-10-CM

## 2018-11-08 DIAGNOSIS — N52.9 ERECTILE DYSFUNCTION, UNSPECIFIED ERECTILE DYSFUNCTION TYPE: ICD-10-CM

## 2018-11-08 PROCEDURE — 99214 OFFICE O/P EST MOD 30 MIN: CPT | Performed by: FAMILY MEDICINE

## 2018-11-08 RX ORDER — SILDENAFIL 100 MG/1
TABLET, FILM COATED ORAL
Qty: 9 TABLET | Refills: 0 | Status: SHIPPED | OUTPATIENT
Start: 2018-11-08 | End: 2018-11-12 | Stop reason: SDUPTHER

## 2018-11-08 RX ORDER — SILDENAFIL 100 MG/1
TABLET, FILM COATED ORAL
Qty: 9 TABLET | Refills: 0 | Status: CANCELLED | OUTPATIENT
Start: 2018-11-08

## 2018-11-08 RX ORDER — SILDENAFIL 100 MG/1
TABLET, FILM COATED ORAL
Qty: 9 TABLET | Refills: 5 | Status: SHIPPED | OUTPATIENT
Start: 2018-11-08 | End: 2018-11-08 | Stop reason: SDUPTHER

## 2018-11-08 RX ORDER — HYDROCODONE BITARTRATE AND ACETAMINOPHEN 5; 325 MG/1; MG/1
1 TABLET ORAL EVERY 6 HOURS PRN
Qty: 30 TABLET | Refills: 0 | Status: SHIPPED | OUTPATIENT
Start: 2018-11-08 | End: 2019-08-05

## 2018-11-08 NOTE — PROGRESS NOTES
Assessment/Plan:  I put a prescription in for small amount of hydrocodone/acetaminophen  I stressed to patient that I will not continue to control his pain for his right knee  He should follow up with his workman's comp doctors in [place]  As stated, patient is going to get surgery his knee in January  For his diabetes, Rx for metformin  For his ED Rx for Viagra  We will get blood work on him  We will see him back in the next 2 months or p r n  No problem-specific Assessment & Plan notes found for this encounter  Diagnoses and all orders for this visit:    Chronic pain of right knee  -     HYDROcodone-acetaminophen (NORCO) 5-325 mg per tablet; Take 1 tablet by mouth every 6 (six) hours as needed for pain Max Daily Amount: 4 tablets    Type 2 diabetes mellitus without complication, without long-term current use of insulin (HCC)  -     metFORMIN (GLUCOPHAGE) 500 mg tablet; Take 1 tablet (500 mg total) by mouth daily  -     Hemoglobin A1C  -     Microalbumin / creatinine urine ratio  -     Comprehensive metabolic panel    Erectile dysfunction, unspecified erectile dysfunction type  -     Testosterone, free, total  -     sildenafil (VIAGRA) 100 mg tablet; 1/2 or 1 tab po 1/2 - 4 hours prior to sexual activity  Subjective:      Patient ID: Juliet Peña is a 41 y o  male  Patient here today for follow-up  Patient denies any chest pain or shortness of breath  Patient is willing to go on metformin for diabetes  Patient also would like some pain medication for his chronic right knee pain/torn ACL  Patient is seeing a doctor in [place] who plans on doing surgery in January of 2019  This was an old workman's comp case  Patient also having trouble with erectile dysfunction  Diabetes   He presents for his follow-up diabetic visit  He has type 2 diabetes mellitus  His disease course has been stable  There are no hypoglycemic associated symptoms  There are no diabetic associated symptoms  There are no hypoglycemic complications  There are no diabetic complications  Risk factors for coronary artery disease include male sex and obesity  Current diabetic treatment includes diet  He is compliant with treatment most of the time  His weight is increasing steadily  He is following a generally healthy diet  When asked about meal planning, he reported none  He rarely participates in exercise  There is no change in his home blood glucose trend  An ACE inhibitor/angiotensin II receptor blocker is not being taken  He does not see a podiatrist Eye exam is not current  Erectile Dysfunction   This is a chronic problem  The current episode started more than 1 month ago  The problem is unchanged  The nature of his difficulty is maintaining erection  Irritative symptoms do not include frequency, nocturia or urgency  Pertinent negatives include no chills, dysuria, genital pain, hematuria, hesitancy or inability to urinate  Nothing aggravates the symptoms  Past treatments include nothing  Risk factors include diabetes mellitus  The following portions of the patient's history were reviewed and updated as appropriate:   He  has a past medical history of Diabetes mellitus ([facility] [address] ) and Sleep apnea  He   Patient Active Problem List    Diagnosis Date Noted    Chronic pain of right knee 11/08/2018    Insufficient sleep syndrome 08/30/2018    Hypersomnia 08/30/2018    Tobacco abuse 08/30/2018    Mood disturbance 08/30/2018    Obesity (BMI 30-39 9) 08/30/2018    Erectile dysfunction 08/30/2018    Type 2 diabetes mellitus without complication, without long-term current use of insulin ([address] ) 07/25/2018    Varicose veins of both lower extremities with pain 07/20/2018    Obstructive sleep apnea syndrome 07/20/2018     He  has a past surgical history that includes Knee surgery  His family history includes No Known Problems in his father and mother  He  reports that he has been smoking Cigarettes    He has been smoking about 0 25 packs per day  He has never used smokeless tobacco  He reports that he does not drink alcohol or use drugs  Current Outpatient Prescriptions   Medication Sig Dispense Refill    HYDROcodone-acetaminophen (NORCO) 5-325 mg per tablet Take 1 tablet by mouth every 6 (six) hours as needed for pain Max Daily Amount: 4 tablets 30 tablet 0    metFORMIN (GLUCOPHAGE) 500 mg tablet Take 1 tablet (500 mg total) by mouth daily 30 tablet 3    sildenafil (VIAGRA) 100 mg tablet 1/2 or 1 tab po 1/2 - 4 hours prior to sexual activity  9 tablet 5     No current facility-administered medications for this visit  No current outpatient prescriptions on file prior to visit  No current facility-administered medications on file prior to visit  He has No Known Allergies       Review of Systems   Constitutional: Negative  Negative for chills  Respiratory: Negative  Cardiovascular: Negative  Gastrointestinal: Negative  Genitourinary: Negative for dysuria, frequency, hematuria, hesitancy, nocturia and urgency  Objective:      /90   Ht 6' 2" (1 88 m)   Wt (!) 137 kg (301 lb)   BMI 38 65 kg/m²          Physical Exam   Constitutional: He is oriented to person, place, and time  He appears well-developed and well-nourished  No distress  HENT:   Head: Normocephalic and atraumatic  Eyes: Conjunctivae are normal    Cardiovascular: Normal rate, regular rhythm and normal heart sounds  Exam reveals no gallop and no friction rub  No murmur heard  Pulmonary/Chest: Effort normal and breath sounds normal  No respiratory distress  He has no wheezes  He has no rales  Musculoskeletal: He exhibits tenderness (Right knee)  He exhibits no edema  Neurological: He is alert and oriented to person, place, and time  Skin: He is not diaphoretic  Psychiatric: He has a normal mood and affect  His behavior is normal  Judgment and thought content normal    Vitals reviewed

## 2018-11-12 DIAGNOSIS — N52.9 ERECTILE DYSFUNCTION, UNSPECIFIED ERECTILE DYSFUNCTION TYPE: ICD-10-CM

## 2018-11-12 RX ORDER — SILDENAFIL 100 MG/1
TABLET, FILM COATED ORAL
Qty: 9 TABLET | Refills: 0 | Status: SHIPPED | OUTPATIENT
Start: 2018-11-12 | End: 2019-02-28

## 2018-11-12 RX ORDER — SILDENAFIL 100 MG/1
TABLET, FILM COATED ORAL
Qty: 9 TABLET | Refills: 0 | Status: CANCELLED | OUTPATIENT
Start: 2018-11-12

## 2018-12-06 ENCOUNTER — OFFICE VISIT (OUTPATIENT)
Dept: FAMILY MEDICINE CLINIC | Facility: CLINIC | Age: 38
End: 2018-12-06
Payer: COMMERCIAL

## 2018-12-06 ENCOUNTER — TELEPHONE (OUTPATIENT)
Dept: FAMILY MEDICINE CLINIC | Facility: CLINIC | Age: 38
End: 2018-12-06

## 2018-12-06 ENCOUNTER — HOSPITAL ENCOUNTER (OUTPATIENT)
Dept: ULTRASOUND IMAGING | Facility: HOSPITAL | Age: 38
Discharge: HOME | End: 2018-12-06
Payer: COMMERCIAL

## 2018-12-06 VITALS
BODY MASS INDEX: 37.17 KG/M2 | SYSTOLIC BLOOD PRESSURE: 116 MMHG | HEIGHT: 74 IN | DIASTOLIC BLOOD PRESSURE: 82 MMHG | WEIGHT: 289.6 LBS

## 2018-12-06 DIAGNOSIS — I83.813 VARICOSE VEINS OF BOTH LOWER EXTREMITIES WITH PAIN: Primary | ICD-10-CM

## 2018-12-06 DIAGNOSIS — I80.3 THROMBOPHLEBITIS OF LEG: ICD-10-CM

## 2018-12-06 DIAGNOSIS — I80.3 THROMBOPHLEBITIS OF LEG: Primary | ICD-10-CM

## 2018-12-06 PROCEDURE — 3008F BODY MASS INDEX DOCD: CPT | Performed by: FAMILY MEDICINE

## 2018-12-06 PROCEDURE — 93971 EXTREMITY STUDY: CPT

## 2018-12-06 PROCEDURE — 99213 OFFICE O/P EST LOW 20 MIN: CPT | Performed by: FAMILY MEDICINE

## 2018-12-06 PROCEDURE — 93971 EXTREMITY STUDY: CPT | Performed by: SURGERY

## 2018-12-06 NOTE — PROGRESS NOTES
Assessment/Plan: We will get a stat venous Doppler on his right leg  Meanwhile I told him to take an 81 mg aspirin daily and use warm compresses to the area  We will call with further instructions once we have the results of the venous Doppler  No problem-specific Assessment & Plan notes found for this encounter  Diagnoses and all orders for this visit:    Thrombophlebitis of leg  -     Cancel: VAS lower limb venous duplex study, unilateral/limited; Future  -     VAS lower limb venous duplex study, unilateral/limited; Future          Subjective:      Patient ID: Valentin Mason is a 41 y o  male  Patient here today stating for the past month has had increasing pain in his varicose veins on the medial side of his right leg  Patient did have a venous Doppler done in September which showed some thrombophlebitis, that got better, then started to get worse this past month  Patient denies any shortness of breath or any fever chills  Leg Pain    The incident occurred more than 1 week ago  There was no injury mechanism  Pain location: Right medial calf  The quality of the pain is described as aching and burning  The pain is moderate  The pain has been constant since onset  The symptoms are aggravated by palpation  He has tried nothing for the symptoms  The following portions of the patient's history were reviewed and updated as appropriate:   He  has a past medical history of Diabetes mellitus ([address] ) and Sleep apnea    He   Patient Active Problem List    Diagnosis Date Noted    Chronic pain of right knee 11/08/2018    Insufficient sleep syndrome 08/30/2018    Hypersomnia 08/30/2018    Tobacco abuse 08/30/2018    Mood disturbance 08/30/2018    Obesity (BMI 30-39 9) 08/30/2018    Erectile dysfunction 08/30/2018    Type 2 diabetes mellitus without complication, without long-term current use of insulin ([address] ) 07/25/2018    Varicose veins of both lower extremities with pain 07/20/2018    Obstructive sleep apnea syndrome 07/20/2018     He  has a past surgical history that includes Knee surgery  His family history includes No Known Problems in his father and mother  He  reports that he has been smoking Cigarettes  He has been smoking about 0 25 packs per day  He has never used smokeless tobacco  He reports that he does not drink alcohol or use drugs  Current Outpatient Prescriptions   Medication Sig Dispense Refill    HYDROcodone-acetaminophen (NORCO) 5-325 mg per tablet Take 1 tablet by mouth every 6 (six) hours as needed for pain Max Daily Amount: 4 tablets 30 tablet 0    metFORMIN (GLUCOPHAGE) 500 mg tablet Take 1 tablet (500 mg total) by mouth daily 30 tablet 3    sildenafil (VIAGRA) 100 mg tablet 1/2 or 1 tab po 1/2 - 4 hours prior to sexual activity  9 tablet 0     No current facility-administered medications for this visit  Current Outpatient Prescriptions on File Prior to Visit   Medication Sig    HYDROcodone-acetaminophen (NORCO) 5-325 mg per tablet Take 1 tablet by mouth every 6 (six) hours as needed for pain Max Daily Amount: 4 tablets    metFORMIN (GLUCOPHAGE) 500 mg tablet Take 1 tablet (500 mg total) by mouth daily    sildenafil (VIAGRA) 100 mg tablet 1/2 or 1 tab po 1/2 - 4 hours prior to sexual activity  No current facility-administered medications on file prior to visit  He has No Known Allergies       Review of Systems   Constitutional: Negative  Respiratory: Negative  Cardiovascular: Negative  Gastrointestinal: Negative  Genitourinary: Negative  Musculoskeletal:        As per HPI         Objective:      /82   Ht 6' 2" (1 88 m)   Wt 131 kg (289 lb 9 6 oz)   BMI 37 18 kg/m²          Physical Exam   Constitutional: He is oriented to person, place, and time  He appears well-developed and well-nourished  No distress  HENT:   Head: Normocephalic and atraumatic     Musculoskeletal: He exhibits tenderness (Positive tenderness on medial proximal calf  Hard varicose vein noted with some mild erythema  )  He exhibits no edema  Neurological: He is alert and oriented to person, place, and time  Skin: He is not diaphoretic  Psychiatric: He has a normal mood and affect  His behavior is normal  Judgment and thought content normal    Vitals reviewed

## 2018-12-19 ENCOUNTER — OFFICE VISIT (OUTPATIENT)
Dept: URGENT CARE | Facility: CLINIC | Age: 38
End: 2018-12-19
Payer: COMMERCIAL

## 2018-12-19 VITALS
TEMPERATURE: 97.8 F | HEIGHT: 73 IN | SYSTOLIC BLOOD PRESSURE: 132 MMHG | BODY MASS INDEX: 38.7 KG/M2 | DIASTOLIC BLOOD PRESSURE: 85 MMHG | HEART RATE: 92 BPM | WEIGHT: 292 LBS | OXYGEN SATURATION: 97 %

## 2018-12-19 DIAGNOSIS — M79.5 FOREIGN BODY (FB) IN SOFT TISSUE: Primary | ICD-10-CM

## 2018-12-19 PROCEDURE — 10120 INC&RMVL FB SUBQ TISS SMPL: CPT

## 2018-12-19 PROCEDURE — 99213 OFFICE O/P EST LOW 20 MIN: CPT

## 2018-12-19 NOTE — PROGRESS NOTES
[facility] [facility] [employer]        NAME: Lucian Rodríguez is a 41 y o  male  : 1980    MRN: [id number]  DATE: 2018  TIME: 10:03 AM    Assessment and Plan   Foreign body (FB) in soft tissue [M79 5]  1  Foreign body (FB) in soft tissue       Foreign body removal  Date/Time: 2018 10:03 AM  Performed by: Yogi Calixto  Authorized by: Leanna Gutierrez Protocol:Consent: Verbal consent obtained  Written consent not obtained  Risks and benefits: risks, benefits and alternatives were discussed  Consent given by: patient  Patient understanding: patient states understanding of the procedure being performed  Patient identity confirmed: verbally with patient    Intake: left posterior thigh  Anesthesia: local infiltration    Anesthesia:  Local Anesthetic: lidocaine 1% without epinephrine  Anesthetic total: 3 mL    Sedation:  Patient sedated: no  Patient restrained: no  Patient cooperative: yes  Complexity: simple  1 objects recovered  Objects recovered: wood splinter 2 inches long   Post-procedure assessment: foreign body removed  Patient tolerance: Patient tolerated the procedure well with no immediate complications      Last tetanus 2 years ago  Bandaid applied  No signs of infection     Patient Instructions       Follow up with PCP in 3-5 days  Proceed to  ER if symptoms worsen  Chief Complaint     Chief Complaint   Patient presents with    Leg Pain     LEFT; SPLINTER IN THIGH         History of Present Illness       41year old male that was watching football on bleachers last night  When he moved sideways while sitting, he got a splinter in his left posterior thigh  His wife tried to remove it last night, however the splinter broke off  He is here to have the rest removed  Just complains of localized pain    No swelling, drainage or discharge         Review of Systems   Review of Systems  As above    Current Medications       Current Outpatient Prescriptions:     HYDROcodone-acetaminophen (NORCO) 5-325 mg per tablet, Take 1 tablet by mouth every 6 (six) hours as needed for pain Max Daily Amount: 4 tablets, Disp: 30 tablet, Rfl: 0    metFORMIN (GLUCOPHAGE) 500 mg tablet, Take 1 tablet (500 mg total) by mouth daily, Disp: 30 tablet, Rfl: 3    sildenafil (VIAGRA) 100 mg tablet, 1/2 or 1 tab po 1/2 - 4 hours prior to sexual activity  , Disp: 9 tablet, Rfl: 0    Current Allergies     Allergies as of 12/19/2018    (No Known Allergies)            The following portions of the patient's history were reviewed and updated as appropriate: allergies, current medications, past family history, past medical history, past social history, past surgical history and problem list      Past Medical History:   Diagnosis Date    Diabetes mellitus ([facility] [address] )     Sleep apnea        Past Surgical History:   Procedure Laterality Date    KNEE SURGERY         Family History   Problem Relation Age of Onset    No Known Problems Mother     No Known Problems Father          Medications have been verified  Objective   /85 (BP Location: Right arm, Patient Position: Sitting, Cuff Size: Standard)   Pulse 92   Temp 97 8 °F (36 6 °C) (Tympanic)   Ht 6' 1" (1 854 m)   Wt 132 kg (292 lb)   SpO2 97%   BMI 38 52 kg/m²        Physical Exam     Physical Exam   Constitutional: He is oriented to person, place, and time  He appears well-developed and well-nourished  HENT:   Head: Normocephalic and atraumatic  Eyes: Conjunctivae are normal    Neck: Neck supple  Cardiovascular: Normal rate  Pulmonary/Chest: Effort normal  No respiratory distress  Abdominal: Soft  He exhibits no distension  Musculoskeletal: Normal range of motion  Neurological: He is alert and oriented to person, place, and time  Skin: Skin is warm and dry  No rash noted  Small area with break in the skin noted left posterior thigh without any signs of infection    Psychiatric: He has a normal mood and affect   His behavior is normal  Judgment and thought content normal

## 2019-01-18 ENCOUNTER — OFFICE VISIT (OUTPATIENT)
Dept: FAMILY MEDICINE CLINIC | Facility: CLINIC | Age: 39
End: 2019-01-18
Payer: COMMERCIAL

## 2019-01-18 ENCOUNTER — APPOINTMENT (OUTPATIENT)
Dept: LAB | Facility: MEDICAL CENTER | Age: 39
End: 2019-01-18
Payer: COMMERCIAL

## 2019-01-18 VITALS
BODY MASS INDEX: 37.86 KG/M2 | SYSTOLIC BLOOD PRESSURE: 118 MMHG | DIASTOLIC BLOOD PRESSURE: 84 MMHG | WEIGHT: 295 LBS | HEIGHT: 74 IN

## 2019-01-18 DIAGNOSIS — M54.2 TENDERNESS OF NECK: Primary | ICD-10-CM

## 2019-01-18 LAB
ALBUMIN SERPL BCP-MCNC: 4.1 G/DL (ref 3.5–5)
ALP SERPL-CCNC: 64 U/L (ref 46–116)
ALT SERPL W P-5'-P-CCNC: 64 U/L (ref 12–78)
ANION GAP SERPL CALCULATED.3IONS-SCNC: 8 MMOL/L (ref 4–13)
AST SERPL W P-5'-P-CCNC: 28 U/L (ref 5–45)
BILIRUB SERPL-MCNC: 0.45 MG/DL (ref 0.2–1)
BUN SERPL-MCNC: 14 MG/DL (ref 5–25)
CALCIUM SERPL-MCNC: 9.3 MG/DL (ref 8.3–10.1)
CHLORIDE SERPL-SCNC: 104 MMOL/L (ref 100–108)
CO2 SERPL-SCNC: 25 MMOL/L (ref 21–32)
CREAT SERPL-MCNC: 0.92 MG/DL (ref 0.6–1.3)
CREAT UR-MCNC: 119 MG/DL
EST. AVERAGE GLUCOSE BLD GHB EST-MCNC: 146 MG/DL
GFR SERPL CREATININE-BSD FRML MDRD: 106 ML/MIN/1.73SQ M
GLUCOSE P FAST SERPL-MCNC: 126 MG/DL (ref 65–99)
HBA1C MFR BLD: 6.7 % (ref 4.2–6.3)
MICROALBUMIN UR-MCNC: 18.5 MG/L (ref 0–20)
MICROALBUMIN/CREAT 24H UR: 16 MG/G CREATININE (ref 0–30)
POTASSIUM SERPL-SCNC: 4.3 MMOL/L (ref 3.5–5.3)
PROT SERPL-MCNC: 7.3 G/DL (ref 6.4–8.2)
SODIUM SERPL-SCNC: 137 MMOL/L (ref 136–145)

## 2019-01-18 PROCEDURE — 80053 COMPREHEN METABOLIC PANEL: CPT | Performed by: FAMILY MEDICINE

## 2019-01-18 PROCEDURE — 82570 ASSAY OF URINE CREATININE: CPT | Performed by: FAMILY MEDICINE

## 2019-01-18 PROCEDURE — 84402 ASSAY OF FREE TESTOSTERONE: CPT | Performed by: FAMILY MEDICINE

## 2019-01-18 PROCEDURE — 99213 OFFICE O/P EST LOW 20 MIN: CPT | Performed by: FAMILY MEDICINE

## 2019-01-18 PROCEDURE — 36415 COLL VENOUS BLD VENIPUNCTURE: CPT | Performed by: FAMILY MEDICINE

## 2019-01-18 PROCEDURE — 84403 ASSAY OF TOTAL TESTOSTERONE: CPT | Performed by: FAMILY MEDICINE

## 2019-01-18 PROCEDURE — 82043 UR ALBUMIN QUANTITATIVE: CPT | Performed by: FAMILY MEDICINE

## 2019-01-18 PROCEDURE — 83036 HEMOGLOBIN GLYCOSYLATED A1C: CPT | Performed by: FAMILY MEDICINE

## 2019-01-18 PROCEDURE — 3061F NEG MICROALBUMINURIA REV: CPT | Performed by: FAMILY MEDICINE

## 2019-01-19 LAB
TESTOST FREE SERPL-MCNC: 11.5 PG/ML (ref 8.7–25.1)
TESTOST SERPL-MCNC: 200 NG/DL (ref 264–916)

## 2019-01-21 DIAGNOSIS — R79.89 LOW TESTOSTERONE: Primary | ICD-10-CM

## 2019-01-21 DIAGNOSIS — N52.9 ERECTILE DYSFUNCTION, UNSPECIFIED ERECTILE DYSFUNCTION TYPE: ICD-10-CM

## 2019-01-24 ENCOUNTER — TELEPHONE (OUTPATIENT)
Dept: UROLOGY | Facility: MEDICAL CENTER | Age: 39
End: 2019-01-24

## 2019-01-24 NOTE — TELEPHONE ENCOUNTER
New patient for erectile dysfunction and testosterone  Reason for appointment/Complaint/Diagnosis : erectile dysfunction and low testosterone    Insurance: [employer]    History of Cancer? no                       If yes, what kind? none    Previous urologist?  no                  Records requested/where? [address]    Outside testing/where? no    Location Preference for office visit? [facility] but will go to [facility] patient paperwork sent via mail

## 2019-02-28 ENCOUNTER — OFFICE VISIT (OUTPATIENT)
Dept: UROLOGY | Facility: MEDICAL CENTER | Age: 39
End: 2019-02-28
Payer: COMMERCIAL

## 2019-02-28 VITALS
BODY MASS INDEX: 37.86 KG/M2 | HEART RATE: 114 BPM | WEIGHT: 295 LBS | DIASTOLIC BLOOD PRESSURE: 86 MMHG | SYSTOLIC BLOOD PRESSURE: 124 MMHG | HEIGHT: 74 IN

## 2019-02-28 DIAGNOSIS — N52.9 ERECTILE DYSFUNCTION, UNSPECIFIED ERECTILE DYSFUNCTION TYPE: Primary | ICD-10-CM

## 2019-02-28 DIAGNOSIS — R79.89 LOW TESTOSTERONE: ICD-10-CM

## 2019-02-28 PROCEDURE — 99204 OFFICE O/P NEW MOD 45 MIN: CPT | Performed by: UROLOGY

## 2019-02-28 RX ORDER — SILDENAFIL CITRATE 20 MG/1
60 TABLET ORAL AS NEEDED
Qty: 90 TABLET | Refills: 5 | Status: SHIPPED | OUTPATIENT
Start: 2019-02-28 | End: 2019-10-02 | Stop reason: DRUGHIGH

## 2019-02-28 NOTE — PROGRESS NOTES
Assessment/Plan:  1  Hypogonadism-check prolactin FSH LH and repeat testosterone panel, consider testosterone replacement therapy  The patient is aware that T RT may cause infertility and at the present time the patient has 3 children and he is not concerned about fertility  2  Erectile dysfunction will order generic sildenafil and the patient will try that for treatment  No problem-specific Assessment & Plan notes found for this encounter  Diagnoses and all orders for this visit:    Erectile dysfunction, unspecified erectile dysfunction type  -     Ambulatory referral to Urology    Low testosterone  -     Ambulatory referral to Urology  -     Prolactin; Future  -     Follicle stimulating hormone; Future  -     Luteinizing hormone; Future  -     Testosterone, free, total; Future          Subjective:      Patient ID: Lexus Keith is a 46 y o  male  Chief complaint:  Low testosterone    History of present illness:  56-year-old male who notes multiple year history feeling fatigued having low sex drive having some problems with erectile dysfunction with poor erectile rigidity difficulty maintaining an erection previously treated with a prescription for Viagra that he has yet to get filled  The patient had a testosterone level of 200 with a normal free fraction now presents for further evaluation treatment  The patient is a type 2 diabetic takes metformin and denies taking hydrocodone very frequently except when he has knee pain  The patient presents for evaluation and treatment  The following portions of the patient's history were reviewed and updated as appropriate: allergies, current medications, past family history, past medical history, past social history, past surgical history and problem list     Review of Systems   Constitutional: Positive for fatigue  HENT: Negative  Eyes: Negative  Respiratory: Negative  Cardiovascular: Negative  Gastrointestinal: Negative  Genitourinary: Negative  Musculoskeletal: Positive for arthralgias and joint swelling  Neurological: Negative  Psychiatric/Behavioral:        Mildly depressed         Objective:      /86 (BP Location: Left arm, Patient Position: Sitting, Cuff Size: Adult)   Pulse (!) 114   Ht 6' 2" (1 88 m)   Wt 134 kg (295 lb)   BMI 37 88 kg/m²          Physical Exam   Constitutional: He is oriented to person, place, and time  He appears well-nourished  No distress  HENT:   Head: Atraumatic  Eyes: EOM are normal    Neck: Neck supple  Pulmonary/Chest: Effort normal    Abdominal: Soft  Genitourinary: Penis normal    Genitourinary Comments: Phallus normal circumcised without cutaneous lesions  Meatus patent normally placed  Scrotum normal without cutaneous lesions  Testes fully descended normal in size without palpable masses or adnexal abnormality bilaterally   Neurological: He is alert and oriented to person, place, and time  Psychiatric: He has a normal mood and affect  His behavior is normal  Judgment and thought content normal    Vitals reviewed

## 2019-04-08 ENCOUNTER — TELEPHONE (OUTPATIENT)
Dept: FAMILY MEDICINE CLINIC | Facility: CLINIC | Age: 39
End: 2019-04-08

## 2019-05-13 ENCOUNTER — OFFICE VISIT (OUTPATIENT)
Dept: FAMILY MEDICINE CLINIC | Facility: CLINIC | Age: 39
End: 2019-05-13
Payer: COMMERCIAL

## 2019-05-13 ENCOUNTER — OFFICE VISIT (OUTPATIENT)
Dept: URGENT CARE | Facility: CLINIC | Age: 39
End: 2019-05-13
Payer: COMMERCIAL

## 2019-05-13 VITALS
HEIGHT: 73 IN | DIASTOLIC BLOOD PRESSURE: 78 MMHG | WEIGHT: 294.8 LBS | BODY MASS INDEX: 39.07 KG/M2 | SYSTOLIC BLOOD PRESSURE: 126 MMHG

## 2019-05-13 VITALS
WEIGHT: 295 LBS | TEMPERATURE: 98.6 F | BODY MASS INDEX: 39.1 KG/M2 | OXYGEN SATURATION: 94 % | RESPIRATION RATE: 18 BRPM | DIASTOLIC BLOOD PRESSURE: 76 MMHG | HEIGHT: 73 IN | SYSTOLIC BLOOD PRESSURE: 136 MMHG | HEART RATE: 115 BPM

## 2019-05-13 DIAGNOSIS — E11.9 TYPE 2 DIABETES MELLITUS WITHOUT COMPLICATION, WITHOUT LONG-TERM CURRENT USE OF INSULIN (HCC): Primary | ICD-10-CM

## 2019-05-13 DIAGNOSIS — I83.91 VARICOSE VEINS OF RIGHT LOWER EXTREMITY, UNSPECIFIED WHETHER COMPLICATED: Primary | ICD-10-CM

## 2019-05-13 LAB — SL AMB POCT HEMOGLOBIN AIC: 6.9 (ref ?–6.5)

## 2019-05-13 PROCEDURE — 83036 HEMOGLOBIN GLYCOSYLATED A1C: CPT | Performed by: FAMILY MEDICINE

## 2019-05-13 PROCEDURE — 3008F BODY MASS INDEX DOCD: CPT | Performed by: FAMILY MEDICINE

## 2019-05-13 PROCEDURE — 99213 OFFICE O/P EST LOW 20 MIN: CPT | Performed by: FAMILY MEDICINE

## 2019-05-13 PROCEDURE — 99213 OFFICE O/P EST LOW 20 MIN: CPT | Performed by: PHYSICIAN ASSISTANT

## 2019-05-13 PROCEDURE — 3044F HG A1C LEVEL LT 7.0%: CPT | Performed by: FAMILY MEDICINE

## 2019-08-05 ENCOUNTER — HOSPITAL ENCOUNTER (EMERGENCY)
Facility: HOSPITAL | Age: 39
Discharge: HOME | End: 2019-08-05
Attending: EMERGENCY MEDICINE | Admitting: EMERGENCY MEDICINE
Payer: COMMERCIAL

## 2019-08-05 ENCOUNTER — APPOINTMENT (EMERGENCY)
Dept: CT IMAGING | Facility: HOSPITAL | Age: 39
End: 2019-08-05
Payer: COMMERCIAL

## 2019-08-05 VITALS
SYSTOLIC BLOOD PRESSURE: 124 MMHG | BODY MASS INDEX: 37.98 KG/M2 | OXYGEN SATURATION: 94 % | DIASTOLIC BLOOD PRESSURE: 66 MMHG | HEART RATE: 90 BPM | RESPIRATION RATE: 18 BRPM | TEMPERATURE: 97.7 F | HEIGHT: 73 IN | WEIGHT: 286.6 LBS

## 2019-08-05 DIAGNOSIS — H60.509 ACUTE OTITIS EXTERNA: Primary | ICD-10-CM

## 2019-08-05 DIAGNOSIS — H66.90 ACUTE OTITIS MEDIA: ICD-10-CM

## 2019-08-05 LAB
ALBUMIN SERPL BCP-MCNC: 3.9 G/DL (ref 3.5–5)
ALP SERPL-CCNC: 72 U/L (ref 46–116)
ALT SERPL W P-5'-P-CCNC: 60 U/L (ref 12–78)
ANION GAP SERPL CALCULATED.3IONS-SCNC: 11 MMOL/L (ref 4–13)
ANION GAP SERPL CALCULATED.3IONS-SCNC: 8 MMOL/L (ref 4–13)
AST SERPL W P-5'-P-CCNC: 27 U/L (ref 5–45)
BASOPHILS # BLD AUTO: 0.04 THOUSANDS/ΜL (ref 0–0.1)
BASOPHILS NFR BLD AUTO: 0 % (ref 0–1)
BILIRUB SERPL-MCNC: 0.5 MG/DL (ref 0.2–1)
BUN SERPL-MCNC: 12 MG/DL (ref 5–25)
BUN SERPL-MCNC: 14 MG/DL (ref 5–25)
CALCIUM SERPL-MCNC: 8 MG/DL (ref 8.3–10.1)
CALCIUM SERPL-MCNC: 9.3 MG/DL (ref 8.3–10.1)
CHLORIDE SERPL-SCNC: 106 MMOL/L (ref 100–108)
CHLORIDE SERPL-SCNC: 99 MMOL/L (ref 100–108)
CO2 SERPL-SCNC: 27 MMOL/L (ref 21–32)
CO2 SERPL-SCNC: 28 MMOL/L (ref 21–32)
CREAT SERPL-MCNC: 1.07 MG/DL (ref 0.6–1.3)
CREAT SERPL-MCNC: 1.34 MG/DL (ref 0.6–1.3)
EOSINOPHIL # BLD AUTO: 0.11 THOUSAND/ΜL (ref 0–0.61)
EOSINOPHIL NFR BLD AUTO: 1 % (ref 0–6)
ERYTHROCYTE [DISTWIDTH] IN BLOOD BY AUTOMATED COUNT: 12.2 % (ref 11.6–15.1)
GFR SERPL CREATININE-BSD FRML MDRD: 67 ML/MIN/1.73SQ M
GFR SERPL CREATININE-BSD FRML MDRD: 88 ML/MIN/1.73SQ M
GLUCOSE SERPL-MCNC: 150 MG/DL (ref 65–140)
GLUCOSE SERPL-MCNC: 155 MG/DL (ref 65–140)
HCT VFR BLD AUTO: 44.3 % (ref 36.5–49.3)
HGB BLD-MCNC: 15.2 G/DL (ref 12–17)
IMM GRANULOCYTES # BLD AUTO: 0.04 THOUSAND/UL (ref 0–0.2)
IMM GRANULOCYTES NFR BLD AUTO: 0 % (ref 0–2)
LYMPHOCYTES # BLD AUTO: 2.72 THOUSANDS/ΜL (ref 0.6–4.47)
LYMPHOCYTES NFR BLD AUTO: 23 % (ref 14–44)
MCH RBC QN AUTO: 32.5 PG (ref 26.8–34.3)
MCHC RBC AUTO-ENTMCNC: 34.3 G/DL (ref 31.4–37.4)
MCV RBC AUTO: 95 FL (ref 82–98)
MONOCYTES # BLD AUTO: 0.93 THOUSAND/ΜL (ref 0.17–1.22)
MONOCYTES NFR BLD AUTO: 8 % (ref 4–12)
NEUTROPHILS # BLD AUTO: 7.89 THOUSANDS/ΜL (ref 1.85–7.62)
NEUTS SEG NFR BLD AUTO: 68 % (ref 43–75)
NRBC BLD AUTO-RTO: 0 /100 WBCS
PLATELET # BLD AUTO: 189 THOUSANDS/UL (ref 149–390)
PMV BLD AUTO: 9.6 FL (ref 8.9–12.7)
POTASSIUM SERPL-SCNC: 3.9 MMOL/L (ref 3.5–5.3)
POTASSIUM SERPL-SCNC: 4.2 MMOL/L (ref 3.5–5.3)
PROT SERPL-MCNC: 7.4 G/DL (ref 6.4–8.2)
RBC # BLD AUTO: 4.67 MILLION/UL (ref 3.88–5.62)
SODIUM SERPL-SCNC: 138 MMOL/L (ref 136–145)
SODIUM SERPL-SCNC: 141 MMOL/L (ref 136–145)
WBC # BLD AUTO: 11.73 THOUSAND/UL (ref 4.31–10.16)

## 2019-08-05 PROCEDURE — 96361 HYDRATE IV INFUSION ADD-ON: CPT

## 2019-08-05 PROCEDURE — 80048 BASIC METABOLIC PNL TOTAL CA: CPT | Performed by: EMERGENCY MEDICINE

## 2019-08-05 PROCEDURE — 36415 COLL VENOUS BLD VENIPUNCTURE: CPT | Performed by: EMERGENCY MEDICINE

## 2019-08-05 PROCEDURE — 70491 CT SOFT TISSUE NECK W/DYE: CPT

## 2019-08-05 PROCEDURE — 99284 EMERGENCY DEPT VISIT MOD MDM: CPT

## 2019-08-05 PROCEDURE — 96360 HYDRATION IV INFUSION INIT: CPT

## 2019-08-05 PROCEDURE — 99283 EMERGENCY DEPT VISIT LOW MDM: CPT | Performed by: EMERGENCY MEDICINE

## 2019-08-05 PROCEDURE — 85025 COMPLETE CBC W/AUTO DIFF WBC: CPT | Performed by: EMERGENCY MEDICINE

## 2019-08-05 PROCEDURE — 80053 COMPREHEN METABOLIC PANEL: CPT | Performed by: EMERGENCY MEDICINE

## 2019-08-05 RX ORDER — CIPROFLOXACIN AND DEXAMETHASONE 3; 1 MG/ML; MG/ML
4 SUSPENSION/ DROPS AURICULAR (OTIC) ONCE
Status: DISCONTINUED | OUTPATIENT
Start: 2019-08-05 | End: 2019-08-05 | Stop reason: HOSPADM

## 2019-08-05 RX ORDER — AMOXICILLIN 500 MG/1
1500 CAPSULE ORAL EVERY 12 HOURS SCHEDULED
Qty: 60 CAPSULE | Refills: 0 | Status: SHIPPED | OUTPATIENT
Start: 2019-08-05 | End: 2019-08-15

## 2019-08-05 RX ORDER — OFLOXACIN 3 MG/ML
5 SOLUTION/ DROPS OPHTHALMIC 4 TIMES DAILY
Qty: 5 ML | Refills: 0 | Status: SHIPPED | OUTPATIENT
Start: 2019-08-05

## 2019-08-05 RX ORDER — OFLOXACIN 3 MG/ML
5 SOLUTION/ DROPS OPHTHALMIC ONCE
Status: COMPLETED | OUTPATIENT
Start: 2019-08-05 | End: 2019-08-05

## 2019-08-05 RX ORDER — ACETAMINOPHEN 325 MG/1
650 TABLET ORAL EVERY 6 HOURS PRN
Qty: 1 BOTTLE | Refills: 0 | Status: SHIPPED | OUTPATIENT
Start: 2019-08-05

## 2019-08-05 RX ORDER — LIDOCAINE HYDROCHLORIDE 10 MG/ML
5 INJECTION, SOLUTION EPIDURAL; INFILTRATION; INTRACAUDAL; PERINEURAL ONCE
Status: COMPLETED | OUTPATIENT
Start: 2019-08-05 | End: 2019-08-05

## 2019-08-05 RX ORDER — AMOXICILLIN 250 MG/1
1000 CAPSULE ORAL ONCE
Status: COMPLETED | OUTPATIENT
Start: 2019-08-05 | End: 2019-08-05

## 2019-08-05 RX ADMIN — AMOXICILLIN 1000 MG: 250 CAPSULE ORAL at 04:42

## 2019-08-05 RX ADMIN — SODIUM CHLORIDE 1000 ML: 0.9 INJECTION, SOLUTION INTRAVENOUS at 03:58

## 2019-08-05 RX ADMIN — LIDOCAINE HYDROCHLORIDE 5 ML: 10 INJECTION, SOLUTION EPIDURAL; INFILTRATION; INTRACAUDAL; PERINEURAL at 03:10

## 2019-08-05 RX ADMIN — IOHEXOL 85 ML: 350 INJECTION, SOLUTION INTRAVENOUS at 02:53

## 2019-08-05 RX ADMIN — OFLOXACIN 5 DROP: 3 SOLUTION/ DROPS OPHTHALMIC at 05:05

## 2019-08-05 RX ADMIN — SODIUM CHLORIDE 1000 ML: 0.9 INJECTION, SOLUTION INTRAVENOUS at 03:14

## 2019-08-05 NOTE — ED NOTES
Patient transported to [address] [facility], [facility]  08/05/19 [id number]

## 2019-08-05 NOTE — DISCHARGE INSTRUCTIONS
Initially your Creatinine level (indicating kidney function) was out of normal   After hydration, it became normal   Tell your PCP about this

## 2019-08-05 NOTE — ED PROVIDER NOTES
History  Chief Complaint   Patient presents with    Earache     Patient has left sided ear pain that radiates down into his jaw  Patient: Grady Madrid y o /male  YOB: 1980  MRN: [id number]  PCP: Isaias Hopper DO  Date of evaluation: 8/5/2019    (N B   Voice-recognition software may have been used in the preparation of this document  Occasional wrong word or "sound-alike" substitutions may have occurred due to the inherent limitations of voice recognition software  Interpretation should be guided by context )    History provided by:  Significant other and patient  Earache   Location:  Left  Behind ear:  No abnormality  Severity:  Severe  Onset quality:  Gradual  Progression:  Worsening  Chronicity:  New  Relieved by:  Nothing  Worsened by:  Nothing  Associated symptoms: no abdominal pain, no cough, no diarrhea, no ear discharge, no fever, no hearing loss ("Like a bubble" sensation), no rash, no sore throat and no vomiting        Prior to Admission Medications   Prescriptions Last Dose Informant Patient Reported? Taking?   metFORMIN (GLUCOPHAGE) 500 mg tablet Not Taking at Unknown time  No No   Sig: Take 1 tablet (500 mg total) by mouth daily   Patient not taking: Reported on 8/5/2019   sildenafil (REVATIO) 20 mg tablet   No No   Sig: Take 3 tablets (60 mg total) by mouth as needed (Erectile dysfunction)   Patient not taking: Reported on 5/13/2019      Facility-Administered Medications: None       Past Medical History:   Diagnosis Date    Diabetes mellitus ([address] )     Sleep apnea        Past Surgical History:   Procedure Laterality Date    KNEE SURGERY         Family History   Problem Relation Age of Onset    No Known Problems Mother     No Known Problems Father      I have reviewed and agree with the history as documented      Social History     Tobacco Use    Smoking status: Current Every Day Smoker     Packs/day: 0 50     Types: Cigarettes    Smokeless tobacco: Never Used Substance Use Topics    Alcohol use: Yes     Frequency: 4 or more times a week     Drinks per session: 5 or 6     Binge frequency: Monthly     Comment: SOCIAL    Drug use: No        Review of Systems   Constitutional: Negative for chills and fever  HENT: Positive for ear pain and voice change  Negative for ear discharge, hearing loss ("Like a bubble" sensation), sore throat and trouble swallowing  Respiratory: Negative for cough and shortness of breath  Cardiovascular: Negative for chest pain and palpitations  Gastrointestinal: Negative for abdominal pain, diarrhea and vomiting  Skin: Negative for rash  Psychiatric/Behavioral: Negative for behavioral problems and confusion  Physical Exam  Physical Exam   Constitutional: He appears well-developed and well-nourished  HENT:   Head: Normocephalic and atraumatic  Left Ear: There is swelling and tenderness  No mastoid tenderness  Left side: pain with mvt of pinna, pressure on tragus  Eyes: Pupils are equal, round, and reactive to light  EOM are normal    Cardiovascular: Normal rate and regular rhythm  Pulmonary/Chest: Effort normal and breath sounds normal    Abdominal: Soft  There is no tenderness  Neurological: He is alert  GCS eye subscore is 4  GCS verbal subscore is 5  GCS motor subscore is 6  Psychiatric: He has a normal mood and affect  His speech is normal and behavior is normal    Nursing note and vitals reviewed        Vital Signs  ED Triage Vitals   Temperature Pulse Respirations Blood Pressure SpO2   08/05/19 0149 08/05/19 0149 08/05/19 0149 08/05/19 0149 08/05/19 0149   97 7 °F (36 5 °C) 97 18 147/84 95 %      Temp Source Heart Rate Source Patient Position - Orthostatic VS BP Location FiO2 (%)   08/05/19 0149 08/05/19 0149 -- -- --   Temporal Monitor         Pain Score       08/05/19 0151       Worst Possible Pain           Vitals:    08/05/19 0149 08/05/19 0230 08/05/19 0330 08/05/19 0430   BP: 147/84 131/81 125/66 124/66 Pulse: 97 94 97 90         Visual Acuity      ED Medications  Medications   lidocaine (PF) (XYLOCAINE-MPF) 1 % injection 5 mL (5 mL Tracheal Tube Given 8/5/19 0310)   iohexol (OMNIPAQUE) 350 MG/ML injection (SINGLE-DOSE) 85 mL (85 mL Intravenous Given 8/5/19 0253)   sodium chloride 0 9 % bolus 1,000 mL (0 mL Intravenous Stopped 8/5/19 0358)   sodium chloride 0 9 % bolus 1,000 mL (0 mL Intravenous Stopped 8/5/19 0502)   amoxicillin (AMOXIL) capsule 1,000 mg (1,000 mg Oral Given 8/5/19 0442)   ofloxacin (OCUFLOX) 0 3 % ophthalmic solution 5 drop (5 drops Otic Given 8/5/19 0505)       Diagnostic Studies  Results Reviewed     Procedure Component Value Units Date/Time    Basic metabolic panel [[id number]]  (Abnormal) Collected:  08/05/19 0458    Lab Status:  Final result Specimen:  Blood from Arm, Right Updated:  08/05/19 0511     Sodium 141 mmol/L      Potassium 3 9 mmol/L      Chloride 106 mmol/L      CO2 27 mmol/L      ANION GAP 8 mmol/L      BUN 14 mg/dL      Creatinine 1 07 mg/dL      Glucose 155 mg/dL      Calcium 8 0 mg/dL      eGFR 88 ml/min/1 73sq m     Narrative:       [place] guidelines for Chronic Kidney Disease (CKD):     Stage 1 with normal or high GFR (GFR > 90 mL/min/1 73 square meters)    Stage 2 Mild CKD (GFR = 60-89 mL/min/1 73 square meters)    Stage 3A Moderate CKD (GFR = 45-59 mL/min/1 73 square meters)    Stage 3B Moderate CKD (GFR = 30-44 mL/min/1 73 square meters)    Stage 4 Severe CKD (GFR = 15-29 mL/min/1 73 square meters)    Stage 5 End Stage CKD (GFR <15 mL/min/1 73 square meters)  Note: GFR calculation is accurate only with a steady state creatinine    Comprehensive metabolic panel [[id number]]  (Abnormal) Collected:  08/05/19 0210    Lab Status:  Final result Specimen:  Blood from Arm, Right Updated:  08/05/19 0232     Sodium 138 mmol/L      Potassium 4 2 mmol/L      Chloride 99 mmol/L      CO2 28 mmol/L      ANION GAP 11 mmol/L      BUN 12 mg/dL Creatinine 1 34 mg/dL      Glucose 150 mg/dL      Calcium 9 3 mg/dL      AST 27 U/L      ALT 60 U/L      Alkaline Phosphatase 72 U/L      Total Protein 7 4 g/dL      Albumin 3 9 g/dL      Total Bilirubin 0 50 mg/dL      eGFR 67 ml/min/1 73sq m     Narrative:       [place] guidelines for Chronic Kidney Disease (CKD):     Stage 1 with normal or high GFR (GFR > 90 mL/min/1 73 square meters)    Stage 2 Mild CKD (GFR = 60-89 mL/min/1 73 square meters)    Stage 3A Moderate CKD (GFR = 45-59 mL/min/1 73 square meters)    Stage 3B Moderate CKD (GFR = 30-44 mL/min/1 73 square meters)    Stage 4 Severe CKD (GFR = 15-29 mL/min/1 73 square meters)    Stage 5 End Stage CKD (GFR <15 mL/min/1 73 square meters)  Note: GFR calculation is accurate only with a steady state creatinine    CBC and differential [[id number]]  (Abnormal) Collected:  08/05/19 0210    Lab Status:  Final result Specimen:  Blood from Arm, Right Updated:  08/05/19 0216     WBC 11 73 Thousand/uL      RBC 4 67 Million/uL      Hemoglobin 15 2 g/dL      Hematocrit 44 3 %      MCV 95 fL      MCH 32 5 pg      MCHC 34 3 g/dL      RDW 12 2 %      MPV 9 6 fL      Platelets 382 Thousands/uL      nRBC 0 /100 WBCs      Neutrophils Relative 68 %      Immat GRANS % 0 %      Lymphocytes Relative 23 %      Monocytes Relative 8 %      Eosinophils Relative 1 %      Basophils Relative 0 %      Neutrophils Absolute 7 89 Thousands/µL      Immature Grans Absolute 0 04 Thousand/uL      Lymphocytes Absolute 2 72 Thousands/µL      Monocytes Absolute 0 93 Thousand/µL      Eosinophils Absolute 0 11 Thousand/µL      Basophils Absolute 0 04 Thousands/µL                  CT soft tissue neck with contrast   Final Result by Ofelia Cisneros DO (08/05 4984)      Some fluid is seen in the left middle ear with thickening of the external auditory canal suspicious for left otitis media/externa  Correlation with the patient's symptoms recommended        Findings discussed with Dr Tish Crooks by Dr Queta Schwarz at 4:16 AM on 8/5/2019  Other findings as above  Workstation performed: [id number]                    Procedures  Procedures       ED Course  ED Course as of Aug 10 0714   Mon Aug 05, 2019   0310 0 92 six months ago   Creatinine(!): 1 34   0519 Creatinine: 1 07                               [facility]  Number of Diagnoses or Management Options  Acute otitis externa: new and does not require workup  Acute otitis media: new and does not require workup     Amount and/or Complexity of Data Reviewed  Tests in the radiology section of CPT®: ordered and reviewed  Tests in the medicine section of CPT®: ordered and reviewed    Patient Progress  Patient progress: improved      Disposition  Final diagnoses:   Acute otitis externa   Acute otitis media     Time reflects when diagnosis was documented in both MDM as applicable and the Disposition within this note     Time User Action Codes Description Comment    8/5/2019  4:53 AM Neli Garcia Add [H60 509] Acute otitis externa     8/5/2019  4:53 AM Lena Campos Modify [H60 509] Acute otitis externa     8/5/2019  5:20 AM Lena Campos Add [H66 90] Acute otitis media       ED Disposition     ED Disposition Condition Date/Time Comment    Discharge Stable Mon Aug 5, 2019  5:20 AM Israel Tapia discharge to home/self care              Follow-up Information     Follow up With Specialties Details Why Contact Info Additional [address] [facility]   [facility] 2  [address] Ent Otolaryngology Call  For followup [address] [facility] [address]  [address] [place] [address], [phone]          Discharge Medication List as of 8/5/2019  5:23 AM      START taking these medications    Details   acetaminophen (TYLENOL) 325 mg tablet Take 2 tablets (650 mg total) by mouth every 6 (six) hours as needed (pain, fever), Starting Mon 8/5/2019, Print      amoxicillin (AMOXIL) 500 mg capsule Take 3 capsules (1,500 mg total) by mouth every 12 (twelve) hours for 10 days (antibiotic) [90 mg/kg/d divided bid max 3 gm daily for otitis media], Starting Mon 8/5/2019, Until Thu 8/15/2019, Print      ofloxacin (OCUFLOX) 0 3 % ophthalmic solution Administer 5 drops into ears 4 (four) times a day, Starting Mon 8/5/2019, Print         CONTINUE these medications which have NOT CHANGED    Details   metFORMIN (GLUCOPHAGE) 500 mg tablet Take 1 tablet (500 mg total) by mouth daily, Starting Mon 5/13/2019, Normal      sildenafil (REVATIO) 20 mg tablet Take 3 tablets (60 mg total) by mouth as needed (Erectile dysfunction), Starting Thu 2/28/2019, Normal           No discharge procedures on file      ED Provider  Electronically Signed by           Jumana Coronel MD  08/10/19 9506

## 2019-09-10 ENCOUNTER — TELEPHONE (OUTPATIENT)
Dept: UROLOGY | Facility: MEDICAL CENTER | Age: 39
End: 2019-09-10

## 2019-09-10 DIAGNOSIS — R79.89 LOW TESTOSTERONE: Primary | ICD-10-CM

## 2019-09-10 NOTE — TELEPHONE ENCOUNTER
Spoke to pt  appt changed to 10/2/19  Re-entered labs ordered to reflect current date to be collected  Pt understands he must be at lab early am for testosterone level

## 2019-09-10 NOTE — TELEPHONE ENCOUNTER
Patient of Dr Mali Corea seen in the [facility] office  Patient called to reschedule an appointment from 4/2019  Patient would like to know what lab test are needed for this appointment  Patient is considering medication for low testosterone  Please advise

## 2019-09-12 ENCOUNTER — APPOINTMENT (OUTPATIENT)
Dept: LAB | Facility: MEDICAL CENTER | Age: 39
End: 2019-09-12
Payer: COMMERCIAL

## 2019-09-12 DIAGNOSIS — R79.89 LOW TESTOSTERONE: ICD-10-CM

## 2019-09-12 LAB — LH SERPL-ACNC: 1.9 MIU/ML (ref 1.2–10.6)

## 2019-09-12 PROCEDURE — 84403 ASSAY OF TOTAL TESTOSTERONE: CPT

## 2019-09-12 PROCEDURE — 83002 ASSAY OF GONADOTROPIN (LH): CPT

## 2019-09-12 PROCEDURE — 84402 ASSAY OF FREE TESTOSTERONE: CPT

## 2019-09-12 PROCEDURE — 36415 COLL VENOUS BLD VENIPUNCTURE: CPT

## 2019-09-13 LAB
TESTOST FREE SERPL-MCNC: 9.1 PG/ML (ref 8.7–25.1)
TESTOST SERPL-MCNC: 189 NG/DL (ref 264–916)

## 2019-10-02 ENCOUNTER — OFFICE VISIT (OUTPATIENT)
Dept: UROLOGY | Facility: MEDICAL CENTER | Age: 39
End: 2019-10-02
Payer: COMMERCIAL

## 2019-10-02 ENCOUNTER — TELEPHONE (OUTPATIENT)
Dept: FAMILY MEDICINE CLINIC | Facility: CLINIC | Age: 39
End: 2019-10-02

## 2019-10-02 VITALS
WEIGHT: 288 LBS | HEART RATE: 66 BPM | BODY MASS INDEX: 38 KG/M2 | SYSTOLIC BLOOD PRESSURE: 138 MMHG | DIASTOLIC BLOOD PRESSURE: 80 MMHG

## 2019-10-02 DIAGNOSIS — R79.89 LOW TESTOSTERONE: Primary | ICD-10-CM

## 2019-10-02 DIAGNOSIS — G47.33 OBSTRUCTIVE SLEEP APNEA SYNDROME: Primary | ICD-10-CM

## 2019-10-02 DIAGNOSIS — N52.1 ERECTILE DYSFUNCTION DUE TO DISEASES CLASSIFIED ELSEWHERE: ICD-10-CM

## 2019-10-02 PROCEDURE — 99214 OFFICE O/P EST MOD 30 MIN: CPT | Performed by: UROLOGY

## 2019-10-02 RX ORDER — SILDENAFIL 100 MG/1
100 TABLET, FILM COATED ORAL DAILY PRN
Qty: 45 TABLET | Refills: 3 | Status: SHIPPED | OUTPATIENT
Start: 2019-10-02

## 2019-10-02 NOTE — TELEPHONE ENCOUNTER
PT WOULD LIKE A GENERIC REFERRAL TO SLEEP DOCTOR  HE HAS A DOCTOR HE IS GOING TO THAT WILL DO SURGERY ON HIM THAT WOULD ELIMINATE USING C-PAP

## 2019-10-02 NOTE — PROGRESS NOTES
Assessment/Plan:   1  Hypogonadism  The patient does have low testosterone low free fraction  He has symptoms of erectile dysfunction and decreased energy levels with fatigue and testosterone replacement therapy is indicated  Prior to initiating that estrogen level and prolactin levels will be ordered  The patient does have some central obesity and would be important to know whether use converting testosterone to estrogen as that would change therapy  If that is the case will consider referral to Endocrinology  Patient informs me that he and his wife were not interested in fertility and therefore TRT will not be contraindicated  2  Erectile dysfunction-repeat prescription will be issued for the patient to fill in [place]  Diagnoses and all orders for this visit:    Low testosterone  -     Prolactin; Future  -     Estrogens, Fractionated, LC/MS/MS; Future          Subjective:     Patient ID: Darian Martin is a 46 y o  male  HPI  History of present illness:  81-year-old male who notes multiple year history feeling fatigued having low sex drive having some problems with erectile dysfunction with poor erectile rigidity difficulty maintaining an erection previously treated with a prescription for Viagra that he has yet to get filled  The patient had a testosterone level of 200 with a normal free fraction now presents for further evaluation treatment  The patient is a type 2 diabetic takes metformin and denies taking hydrocodone very frequently except when he has knee pain  The patient returns for discussion of laboratory testing in future therapy  Review of Systems   Constitutional: Positive for fatigue  Musculoskeletal: Positive for arthralgias and myalgias  Objective:     Physical Exam   Constitutional: He is oriented to person, place, and time  He appears well-developed and well-nourished  HENT:   Head: Atraumatic  Eyes: EOM are normal    Neck: Neck supple     Pulmonary/Chest: Effort normal    Abdominal: Soft  He exhibits no distension  obese   Neurological: He is alert and oriented to person, place, and time  Psychiatric: He has a normal mood and affect  His behavior is normal  Judgment and thought content normal    Vitals reviewed

## 2019-10-08 ENCOUNTER — APPOINTMENT (OUTPATIENT)
Dept: OTOLARYNGOLOGY | Facility: CLINIC | Age: 39
End: 2019-10-08
Payer: COMMERCIAL

## 2019-10-08 VITALS
HEART RATE: 113 BPM | DIASTOLIC BLOOD PRESSURE: 85 MMHG | TEMPERATURE: 98.4 F | SYSTOLIC BLOOD PRESSURE: 119 MMHG | OXYGEN SATURATION: 97 %

## 2019-10-08 VITALS — HEIGHT: 73 IN | BODY MASS INDEX: 38.43 KG/M2 | WEIGHT: 290 LBS

## 2019-10-08 DIAGNOSIS — Z86.39 PERSONAL HISTORY OF OTHER ENDOCRINE, NUTRITIONAL AND METABOLIC DISEASE: ICD-10-CM

## 2019-10-08 DIAGNOSIS — Z78.9 OTHER SPECIFIED HEALTH STATUS: ICD-10-CM

## 2019-10-08 DIAGNOSIS — F17.200 NICOTINE DEPENDENCE, UNSPECIFIED, UNCOMPLICATED: ICD-10-CM

## 2019-10-08 DIAGNOSIS — Z86.69 PERSONAL HISTORY OF OTHER DISEASES OF THE NERVOUS SYSTEM AND SENSE ORGANS: ICD-10-CM

## 2019-10-08 PROCEDURE — 31575 DIAGNOSTIC LARYNGOSCOPY: CPT

## 2019-10-08 PROCEDURE — 99204 OFFICE O/P NEW MOD 45 MIN: CPT | Mod: 25

## 2019-10-08 RX ORDER — METFORMIN HYDROCHLORIDE 500 MG/1
500 TABLET, COATED ORAL
Refills: 0 | Status: ACTIVE | COMMUNITY

## 2019-10-08 NOTE — PHYSICAL EXAM
[FreeTextEntry1] : Obese, smoker DM and HBP [Normal] : tympanic membranes are normal in both ears [] : septum deviated bilaterally [de-identified] : large [de-identified] : Large obstructing

## 2019-10-08 NOTE — REVIEW OF SYSTEMS
[Patient Intake Form Reviewed] : Patient intake form was reviewed [Nasal Congestion] : nasal congestion [Problem Snoring] : problem snoring [As Noted in HPI] : as noted in HPI [Negative] : Heme/Lymph

## 2019-10-08 NOTE — REASON FOR VISIT
[Initial Evaluation] : an initial evaluation for [FreeTextEntry2] : Sleep Apnea consultation for the past couple of years. Patient states his level of severity is a level 10 out of 10 and it  occurs constant.   Patient states nothing helps to improve or worsens his Sleep Apnea consultation for the past couple of years.

## 2019-10-08 NOTE — PROCEDURE
[Congested] : congested [Image(s) Captured] : image(s) captured and filed [Topical Lidocaine] : topical lidocaine [Serial Number: ___] : Serial Number: [unfilled] [Flexible Endoscope] : examined with the flexible endoscope [de-identified] : Turbinate Hypertrophy, Enlarged Tonsils, Elongated Uvula.  Lingual Tonsillitis [de-identified] : Lingual Tonsil Hypertrophy

## 2019-10-08 NOTE — HISTORY OF PRESENT ILLNESS
[de-identified] : 39 years old male patient with history of Sleep Apnea consultation for the past couple of years.  Patient is present today in the office with  C/O Snoring loud enough to disturb his sleep or that of others. Waking up gasping or choking. Intermittent pauses in his breathing during sleep. Excessive daytime drowsiness, which may cause you to fall asleep while he is working, watching television or even driving a vehicle. Patient failed CPAP Machine trial.  Patient with history of smoking 1 pack of cigarettes a day.  Patient is morbid obesity.  Patient was recently diagnosed with Diabetes Type 2.  Patient is not adhering to medication administration. Turbinate Hypertrophy, Enlarged Tonsils, Elongated Uvula.  Lingual Tonsillitis\par

## 2019-10-30 ENCOUNTER — CONSULT (OUTPATIENT)
Dept: FAMILY MEDICINE CLINIC | Facility: CLINIC | Age: 39
End: 2019-10-30
Payer: COMMERCIAL

## 2019-10-30 VITALS
HEIGHT: 73 IN | DIASTOLIC BLOOD PRESSURE: 84 MMHG | SYSTOLIC BLOOD PRESSURE: 112 MMHG | WEIGHT: 286.6 LBS | BODY MASS INDEX: 37.98 KG/M2

## 2019-10-30 DIAGNOSIS — E11.9 TYPE 2 DIABETES MELLITUS WITHOUT COMPLICATION, WITHOUT LONG-TERM CURRENT USE OF INSULIN (HCC): ICD-10-CM

## 2019-10-30 DIAGNOSIS — Z01.818 PRE-OP EXAMINATION: Primary | ICD-10-CM

## 2019-10-30 DIAGNOSIS — F51.12 INSUFFICIENT SLEEP SYNDROME: ICD-10-CM

## 2019-10-30 LAB
LEFT EYE DIABETIC RETINOPATHY: NORMAL
LEFT EYE IMAGE QUALITY: NORMAL
LEFT EYE MACULAR EDEMA: NORMAL
LEFT EYE OTHER RETINOPATHY: NORMAL
RIGHT EYE DIABETIC RETINOPATHY: NORMAL
RIGHT EYE IMAGE QUALITY: NORMAL
RIGHT EYE MACULAR EDEMA: NORMAL
RIGHT EYE OTHER RETINOPATHY: NORMAL
SEVERITY (EYE EXAM): NORMAL
SL AMB POCT HEMOGLOBIN AIC: 7 (ref ?–6.5)

## 2019-10-30 PROCEDURE — 99213 OFFICE O/P EST LOW 20 MIN: CPT | Performed by: FAMILY MEDICINE

## 2019-10-30 PROCEDURE — 83036 HEMOGLOBIN GLYCOSYLATED A1C: CPT | Performed by: FAMILY MEDICINE

## 2019-10-30 NOTE — PROGRESS NOTES
Subjective:     Jessie Jefferson is a 46 y o  male who presents to the office today for a preoperative consultation at the request of surgeon Dr Ainsley Nicholson who plans on performing Removal of Uvula on November 13  This consultation is requested for the specific conditions prompting preoperative evaluation (i e  because of potential affect on operative risk): DM2  Planned anesthesia: general  The patient has the following known anesthesia issues: none  Patients bleeding risk: no recent abnormal bleeding  The following portions of the patient's history were reviewed and updated as appropriate:   He  has a past medical history of Diabetes mellitus ([facility] [address] ) and Sleep apnea  He   Patient Active Problem List    Diagnosis Date Noted    Low testosterone 01/21/2019    Chronic pain of right knee 11/08/2018    Insufficient sleep syndrome 08/30/2018    Hypersomnia 08/30/2018    Tobacco abuse 08/30/2018    Mood disturbance 08/30/2018    Obesity (BMI 30-39 9) 08/30/2018    Erectile dysfunction 08/30/2018    Type 2 diabetes mellitus without complication, without long-term current use of insulin ([facility] [address] ) 07/25/2018    Varicose veins of both lower extremities with pain 07/20/2018    Obstructive sleep apnea syndrome 07/20/2018     He  has a past surgical history that includes Knee surgery  His family history includes No Known Problems in his father and mother  He  reports that he has been smoking cigarettes  He has been smoking about 0 50 packs per day  He has never used smokeless tobacco  He reports that he drinks alcohol  He reports that he does not use drugs    Current Outpatient Medications   Medication Sig Dispense Refill    acetaminophen (TYLENOL) 325 mg tablet Take 2 tablets (650 mg total) by mouth every 6 (six) hours as needed (pain, fever) (Patient not taking: Reported on 10/30/2019) 1 Bottle 0    metFORMIN (GLUCOPHAGE) 500 mg tablet Take 1 tablet (500 mg total) by mouth daily (Patient not taking: Reported on 8/5/2019) 30 tablet 3    ofloxacin (OCUFLOX) 0 3 % ophthalmic solution Administer 5 drops into ears 4 (four) times a day (Patient not taking: Reported on 10/30/2019) 5 mL 0    sildenafil (VIAGRA) 100 mg tablet Take 1 tablet (100 mg total) by mouth daily as needed for erectile dysfunction (Patient not taking: Reported on 10/30/2019) 45 tablet 3     No current facility-administered medications for this visit  Current Outpatient Medications on File Prior to Visit   Medication Sig    acetaminophen (TYLENOL) 325 mg tablet Take 2 tablets (650 mg total) by mouth every 6 (six) hours as needed (pain, fever) (Patient not taking: Reported on 10/30/2019)    metFORMIN (GLUCOPHAGE) 500 mg tablet Take 1 tablet (500 mg total) by mouth daily (Patient not taking: Reported on 8/5/2019)    ofloxacin (OCUFLOX) 0 3 % ophthalmic solution Administer 5 drops into ears 4 (four) times a day (Patient not taking: Reported on 10/30/2019)    sildenafil (VIAGRA) 100 mg tablet Take 1 tablet (100 mg total) by mouth daily as needed for erectile dysfunction (Patient not taking: Reported on 10/30/2019)     No current facility-administered medications on file prior to visit  He has No Known Allergies       Review of Systems  Pertinent items are noted in HPI  Objective:  Physical Exam   Constitutional: He is oriented to person, place, and time  He appears well-developed and well-nourished  No distress  HENT:   Head: Normocephalic and atraumatic  Eyes: Conjunctivae are normal    Cardiovascular: Normal rate, regular rhythm and normal heart sounds  Exam reveals no gallop and no friction rub  No murmur heard  Pulmonary/Chest: Effort normal and breath sounds normal  No respiratory distress  He has no wheezes  He has no rales  Musculoskeletal: He exhibits no edema  Neurological: He is alert and oriented to person, place, and time  Skin: He is not diaphoretic  Psychiatric: He has a normal mood and affect   His behavior is normal  Judgment and thought content normal    Vitals reviewed  Lab Review   Appointment on 09/12/2019   Component Date Value    LH 09/12/2019 1 9     Testosterone, Free 09/12/2019 9 1     TESTOSTERONE TOTAL 09/12/2019 189*        Assessment:     46 y o  male with planned surgery as above  Known risk factors for perioperative complications: Diabetes mellitus    Difficulty with intubation is not anticipated  Current medications which may produce withdrawal symptoms if withheld perioperatively: none      Plan:  Medically cleared for surgery on 11/13/2019 by Dr Kailee Marroquin  HbA1C ok at 7 0 (6 9 on 5/13/2019)  Labs reviewed  F/U here in 3 months/prn   1  Preoperative workup as follows glucose  2  Change in medication regimen before surgery: none, continue medication regimen including morning of surgery, with sip of water      3  Other measures: none

## 2019-11-07 ENCOUNTER — TELEPHONE (OUTPATIENT)
Dept: FAMILY MEDICINE CLINIC | Facility: CLINIC | Age: 39
End: 2019-11-07

## 2019-11-07 DIAGNOSIS — Z01.818 PRE-OP EXAMINATION: Primary | ICD-10-CM

## 2019-11-07 NOTE — TELEPHONE ENCOUNTER
Received a fax from his ear nose and throat doctor  I placed an order for blood work for him to get done  Have him come in for an EKG  Once we have these results, we will fax them to his doctor for clearance    If the surgery is scheduled before 11/30/2019, he does not have to come in for an appointment

## 2019-11-08 ENCOUNTER — TELEPHONE (OUTPATIENT)
Dept: FAMILY MEDICINE CLINIC | Facility: CLINIC | Age: 39
End: 2019-11-08

## 2019-11-08 ENCOUNTER — APPOINTMENT (OUTPATIENT)
Dept: LAB | Facility: HOSPITAL | Age: 39
End: 2019-11-08
Payer: COMMERCIAL

## 2019-11-08 ENCOUNTER — HOSPITAL ENCOUNTER (OUTPATIENT)
Dept: NON INVASIVE DIAGNOSTICS | Facility: HOSPITAL | Age: 39
Discharge: HOME | End: 2019-11-08
Payer: COMMERCIAL

## 2019-11-08 DIAGNOSIS — R79.89 LOW TESTOSTERONE: ICD-10-CM

## 2019-11-08 DIAGNOSIS — Z01.818 PRE-OP EXAMINATION: ICD-10-CM

## 2019-11-08 DIAGNOSIS — Z01.818 PRE-OP EXAMINATION: Primary | ICD-10-CM

## 2019-11-08 LAB
ALBUMIN SERPL BCP-MCNC: 4 G/DL (ref 3.5–5)
ALP SERPL-CCNC: 64 U/L (ref 46–116)
ALT SERPL W P-5'-P-CCNC: 63 U/L (ref 12–78)
ANION GAP SERPL CALCULATED.3IONS-SCNC: 9 MMOL/L (ref 4–13)
APTT PPP: 24 SECONDS (ref 23–37)
AST SERPL W P-5'-P-CCNC: 21 U/L (ref 5–45)
ATRIAL RATE: 85 BPM
BASOPHILS # BLD AUTO: 0.05 THOUSANDS/ΜL (ref 0–0.1)
BASOPHILS NFR BLD AUTO: 1 % (ref 0–1)
BILIRUB SERPL-MCNC: 0.4 MG/DL (ref 0.2–1)
BUN SERPL-MCNC: 14 MG/DL (ref 5–25)
CALCIUM SERPL-MCNC: 9.3 MG/DL (ref 8.3–10.1)
CHLORIDE SERPL-SCNC: 103 MMOL/L (ref 100–108)
CO2 SERPL-SCNC: 26 MMOL/L (ref 21–32)
CREAT SERPL-MCNC: 0.93 MG/DL (ref 0.6–1.3)
EOSINOPHIL # BLD AUTO: 0.14 THOUSAND/ΜL (ref 0–0.61)
EOSINOPHIL NFR BLD AUTO: 2 % (ref 0–6)
ERYTHROCYTE [DISTWIDTH] IN BLOOD BY AUTOMATED COUNT: 12.1 % (ref 11.6–15.1)
GFR SERPL CREATININE-BSD FRML MDRD: 104 ML/MIN/1.73SQ M
GLUCOSE P FAST SERPL-MCNC: 165 MG/DL (ref 65–99)
HCT VFR BLD AUTO: 48.9 % (ref 36.5–49.3)
HGB BLD-MCNC: 16.7 G/DL (ref 12–17)
IMM GRANULOCYTES # BLD AUTO: 0.04 THOUSAND/UL (ref 0–0.2)
IMM GRANULOCYTES NFR BLD AUTO: 1 % (ref 0–2)
INR PPP: 0.93 (ref 0.84–1.19)
LYMPHOCYTES # BLD AUTO: 2.67 THOUSANDS/ΜL (ref 0.6–4.47)
LYMPHOCYTES NFR BLD AUTO: 33 % (ref 14–44)
MCH RBC QN AUTO: 32.7 PG (ref 26.8–34.3)
MCHC RBC AUTO-ENTMCNC: 34.2 G/DL (ref 31.4–37.4)
MCV RBC AUTO: 96 FL (ref 82–98)
MONOCYTES # BLD AUTO: 0.61 THOUSAND/ΜL (ref 0.17–1.22)
MONOCYTES NFR BLD AUTO: 8 % (ref 4–12)
NEUTROPHILS # BLD AUTO: 4.54 THOUSANDS/ΜL (ref 1.85–7.62)
NEUTS SEG NFR BLD AUTO: 55 % (ref 43–75)
NRBC BLD AUTO-RTO: 0 /100 WBCS
P AXIS: 42 DEGREES
PLATELET # BLD AUTO: 190 THOUSANDS/UL (ref 149–390)
PMV BLD AUTO: 9.9 FL (ref 8.9–12.7)
POTASSIUM SERPL-SCNC: 4.2 MMOL/L (ref 3.5–5.3)
PR INTERVAL: 136 MS
PROLACTIN SERPL-MCNC: 5.7 NG/ML (ref 2.5–17.4)
PROT SERPL-MCNC: 7.6 G/DL (ref 6.4–8.2)
PROTHROMBIN TIME: 12.5 SECONDS (ref 11.6–14.5)
QRS AXIS: 73 DEGREES
QRSD INTERVAL: 88 MS
QT INTERVAL: 374 MS
QTC INTERVAL: 445 MS
RBC # BLD AUTO: 5.11 MILLION/UL (ref 3.88–5.62)
SODIUM SERPL-SCNC: 138 MMOL/L (ref 136–145)
T WAVE AXIS: 3 DEGREES
VENTRICULAR RATE: 85 BPM
WBC # BLD AUTO: 8.05 THOUSAND/UL (ref 4.31–10.16)

## 2019-11-08 PROCEDURE — 93010 ELECTROCARDIOGRAM REPORT: CPT | Performed by: INTERNAL MEDICINE

## 2019-11-08 PROCEDURE — 82671 ASSAY OF ESTROGENS: CPT

## 2019-11-08 PROCEDURE — 80053 COMPREHEN METABOLIC PANEL: CPT | Performed by: FAMILY MEDICINE

## 2019-11-08 PROCEDURE — 85025 COMPLETE CBC W/AUTO DIFF WBC: CPT | Performed by: FAMILY MEDICINE

## 2019-11-08 PROCEDURE — 85610 PROTHROMBIN TIME: CPT | Performed by: FAMILY MEDICINE

## 2019-11-08 PROCEDURE — 36415 COLL VENOUS BLD VENIPUNCTURE: CPT | Performed by: FAMILY MEDICINE

## 2019-11-08 PROCEDURE — 93005 ELECTROCARDIOGRAM TRACING: CPT

## 2019-11-08 PROCEDURE — 85730 THROMBOPLASTIN TIME PARTIAL: CPT | Performed by: FAMILY MEDICINE

## 2019-11-08 PROCEDURE — 84146 ASSAY OF PROLACTIN: CPT

## 2019-11-08 NOTE — TELEPHONE ENCOUNTER
Patient's EKG showed some mild T-wave abnormalities in his inferior leads    Does he have any chest pain or shortness of breath with exertion or at rest?

## 2019-11-08 NOTE — TELEPHONE ENCOUNTER
As long as he got his blood work an EKG done, which I believe he is doing it today, I do not need to see him for clearance    As soon as we get everything back, we will fax it to his

## 2019-11-08 NOTE — TELEPHONE ENCOUNTER
Pt aware and states that if Dr Anthony Ranks office does not receive everything today they are going to cancel his surgery for Wed

## 2019-11-08 NOTE — TELEPHONE ENCOUNTER
HE TOLD ME YESTERDAY HE IS GETTING LABS DONE TODAY WITH AND EKG    I DID MAKE HIM AN APPT FOR NEXT WEEK IF HE DOESN'T NEED IT I WILL CANCEL IT

## 2019-11-12 VITALS
HEART RATE: 92 BPM | DIASTOLIC BLOOD PRESSURE: 86 MMHG | HEIGHT: 74 IN | TEMPERATURE: 98 F | SYSTOLIC BLOOD PRESSURE: 122 MMHG | WEIGHT: 282.19 LBS | RESPIRATION RATE: 16 BRPM | OXYGEN SATURATION: 95 %

## 2019-11-12 RX ORDER — LIDOCAINE HYDROCHLORIDE 20 MG/ML
2 SOLUTION ORAL; TOPICAL
Qty: 1 | Refills: 3 | Status: ACTIVE | COMMUNITY
Start: 2019-11-12 | End: 1900-01-01

## 2019-11-12 RX ORDER — METHYLPREDNISOLONE 4 MG/1
4 TABLET ORAL
Qty: 1 | Refills: 0 | Status: ACTIVE | COMMUNITY
Start: 2019-11-12 | End: 1900-01-01

## 2019-11-12 RX ORDER — DOCUSATE SODIUM 100 MG/1
100 CAPSULE ORAL TWICE DAILY
Qty: 1 | Refills: 0 | Status: ACTIVE | COMMUNITY
Start: 2019-11-12 | End: 1900-01-01

## 2019-11-12 NOTE — ASU PATIENT PROFILE, ADULT - NS PRO AD PATIENT TYPE
Health Care Proxy (HCP) Health Care Proxy (HCP)/Dad, Johanna Randhawa, [phone] Health Care Proxy (HCP)/Dad, Armanijennifer Edis, [phone]

## 2019-11-12 NOTE — ASU PATIENT PROFILE, ADULT - PMH
Erectile dysfunction    Mood disturbance    [place] (obstructive sleep apnea)    Tobacco abuse DM (diabetes mellitus)  type 2  Erectile dysfunction    Insufficient sleep syndrome    Mood disturbance  pt denies  [place] (obstructive sleep apnea)    Tobacco abuse

## 2019-11-13 ENCOUNTER — APPOINTMENT (OUTPATIENT)
Dept: OTOLARYNGOLOGY | Facility: HOSPITAL | Age: 39
End: 2019-11-13

## 2019-11-13 ENCOUNTER — OUTPATIENT (OUTPATIENT)
Dept: OUTPATIENT SERVICES | Facility: HOSPITAL | Age: 39
LOS: 1 days | Discharge: HOME | End: 2019-11-13
Payer: COMMERCIAL

## 2019-11-13 ENCOUNTER — RESULT REVIEW (OUTPATIENT)
Age: 39
End: 2019-11-13

## 2019-11-13 DIAGNOSIS — Z98.890 OTHER SPECIFIED POSTPROCEDURAL STATES: Chronic | ICD-10-CM

## 2019-11-13 LAB
GLUCOSE BLDC GLUCOMTR-MCNC: 162 MG/DL — HIGH (ref 70–99)
GLUCOSE BLDC GLUCOMTR-MCNC: 176 MG/DL — HIGH (ref 70–99)
GLUCOSE BLDC GLUCOMTR-MCNC: 225 MG/DL — HIGH (ref 70–99)
GLUCOSE BLDC GLUCOMTR-MCNC: 261 MG/DL — HIGH (ref 70–99)

## 2019-11-13 PROCEDURE — 31572 LARGSC W/LASER DSTRJ LES: CPT | Mod: 59

## 2019-11-13 PROCEDURE — 42145 REPAIR PALATE PHARYNX/UVULA: CPT

## 2019-11-13 PROCEDURE — S2900 ROBOTIC SURGICAL SYSTEM: CPT | Mod: NC

## 2019-11-13 PROCEDURE — 21685 HYOID MYOTOMY & SUSPENSION: CPT

## 2019-11-13 PROCEDURE — 31420 EPIGLOTTIDECTOMY: CPT

## 2019-11-13 RX ORDER — BENZOCAINE AND MENTHOL 5; 1 G/100ML; G/100ML
1 LIQUID ORAL
Refills: 0 | Status: DISCONTINUED | OUTPATIENT
Start: 2019-11-13 | End: 2019-11-14

## 2019-11-13 RX ORDER — DEXTROSE 50 % IN WATER 50 %
12.5 SYRINGE (ML) INTRAVENOUS ONCE
Refills: 0 | Status: DISCONTINUED | OUTPATIENT
Start: 2019-11-13 | End: 2019-11-14

## 2019-11-13 RX ORDER — DEXTROSE 50 % IN WATER 50 %
25 SYRINGE (ML) INTRAVENOUS ONCE
Refills: 0 | Status: DISCONTINUED | OUTPATIENT
Start: 2019-11-13 | End: 2019-11-14

## 2019-11-13 RX ORDER — SODIUM CHLORIDE 9 MG/ML
1000 INJECTION, SOLUTION INTRAVENOUS
Refills: 0 | Status: DISCONTINUED | OUTPATIENT
Start: 2019-11-13 | End: 2019-11-14

## 2019-11-13 RX ORDER — HYDROMORPHONE HYDROCHLORIDE 2 MG/ML
1 INJECTION INTRAMUSCULAR; INTRAVENOUS; SUBCUTANEOUS
Refills: 0 | Status: DISCONTINUED | OUTPATIENT
Start: 2019-11-13 | End: 2019-11-14

## 2019-11-13 RX ORDER — INSULIN LISPRO 100/ML
VIAL (ML) SUBCUTANEOUS
Refills: 0 | Status: DISCONTINUED | OUTPATIENT
Start: 2019-11-13 | End: 2019-11-14

## 2019-11-13 RX ORDER — DEXTROSE 50 % IN WATER 50 %
15 SYRINGE (ML) INTRAVENOUS ONCE
Refills: 0 | Status: DISCONTINUED | OUTPATIENT
Start: 2019-11-13 | End: 2019-11-14

## 2019-11-13 RX ORDER — DEXAMETHASONE 0.5 MG/5ML
8 ELIXIR ORAL EVERY 8 HOURS
Refills: 0 | Status: COMPLETED | OUTPATIENT
Start: 2019-11-13 | End: 2019-11-14

## 2019-11-13 RX ORDER — ACETAMINOPHEN 500 MG
650 TABLET ORAL EVERY 6 HOURS
Refills: 0 | Status: DISCONTINUED | OUTPATIENT
Start: 2019-11-13 | End: 2019-11-14

## 2019-11-13 RX ORDER — CEFAZOLIN SODIUM 1 G
1000 VIAL (EA) INJECTION EVERY 8 HOURS
Refills: 0 | Status: DISCONTINUED | OUTPATIENT
Start: 2019-11-13 | End: 2019-11-13

## 2019-11-13 RX ORDER — HYDROMORPHONE HYDROCHLORIDE 2 MG/ML
0.5 INJECTION INTRAMUSCULAR; INTRAVENOUS; SUBCUTANEOUS ONCE
Refills: 0 | Status: DISCONTINUED | OUTPATIENT
Start: 2019-11-13 | End: 2019-11-13

## 2019-11-13 RX ORDER — CEFAZOLIN SODIUM 1 G
1000 VIAL (EA) INJECTION EVERY 8 HOURS
Refills: 0 | Status: DISCONTINUED | OUTPATIENT
Start: 2019-11-13 | End: 2019-11-14

## 2019-11-13 RX ORDER — GLUCAGON INJECTION, SOLUTION 0.5 MG/.1ML
1 INJECTION, SOLUTION SUBCUTANEOUS ONCE
Refills: 0 | Status: DISCONTINUED | OUTPATIENT
Start: 2019-11-13 | End: 2019-11-14

## 2019-11-13 RX ORDER — ACETAMINOPHEN 500 MG
1000 TABLET ORAL ONCE
Refills: 0 | Status: COMPLETED | OUTPATIENT
Start: 2019-11-13 | End: 2019-11-13

## 2019-11-13 RX ORDER — OXYCODONE HYDROCHLORIDE 5 MG/1
5 TABLET ORAL EVERY 4 HOURS
Refills: 0 | Status: DISCONTINUED | OUTPATIENT
Start: 2019-11-13 | End: 2019-11-14

## 2019-11-13 RX ORDER — ONDANSETRON 8 MG/1
4 TABLET, FILM COATED ORAL EVERY 6 HOURS
Refills: 0 | Status: DISCONTINUED | OUTPATIENT
Start: 2019-11-13 | End: 2019-11-14

## 2019-11-13 RX ORDER — OXYCODONE HYDROCHLORIDE 5 MG/1
10 TABLET ORAL EVERY 4 HOURS
Refills: 0 | Status: DISCONTINUED | OUTPATIENT
Start: 2019-11-13 | End: 2019-11-14

## 2019-11-13 RX ADMIN — Medication 101.6 MILLIGRAM(S): at 14:40

## 2019-11-13 RX ADMIN — Medication 1000 MILLIGRAM(S): at 21:27

## 2019-11-13 RX ADMIN — HYDROMORPHONE HYDROCHLORIDE 1 MILLIGRAM(S): 2 INJECTION INTRAMUSCULAR; INTRAVENOUS; SUBCUTANEOUS at 09:00

## 2019-11-13 RX ADMIN — OXYCODONE HYDROCHLORIDE 10 MILLIGRAM(S): 5 TABLET ORAL at 14:33

## 2019-11-13 RX ADMIN — OXYCODONE HYDROCHLORIDE 5 MILLIGRAM(S): 5 TABLET ORAL at 18:29

## 2019-11-13 RX ADMIN — Medication 101.6 MILLIGRAM(S): at 22:18

## 2019-11-13 RX ADMIN — HYDROMORPHONE HYDROCHLORIDE 1 MILLIGRAM(S): 2 INJECTION INTRAMUSCULAR; INTRAVENOUS; SUBCUTANEOUS at 09:38

## 2019-11-13 RX ADMIN — HYDROMORPHONE HYDROCHLORIDE 0.5 MILLIGRAM(S): 2 INJECTION INTRAMUSCULAR; INTRAVENOUS; SUBCUTANEOUS at 10:13

## 2019-11-13 RX ADMIN — OXYCODONE HYDROCHLORIDE 5 MILLIGRAM(S): 5 TABLET ORAL at 22:38

## 2019-11-13 RX ADMIN — OXYCODONE HYDROCHLORIDE 10 MILLIGRAM(S): 5 TABLET ORAL at 17:19

## 2019-11-13 RX ADMIN — HYDROMORPHONE HYDROCHLORIDE 0.5 MILLIGRAM(S): 2 INJECTION INTRAMUSCULAR; INTRAVENOUS; SUBCUTANEOUS at 09:56

## 2019-11-13 RX ADMIN — BENZOCAINE AND MENTHOL 1 LOZENGE: 5; 1 LIQUID ORAL at 11:22

## 2019-11-13 RX ADMIN — OXYCODONE HYDROCHLORIDE 5 MILLIGRAM(S): 5 TABLET ORAL at 23:50

## 2019-11-13 RX ADMIN — SODIUM CHLORIDE 150 MILLILITER(S): 9 INJECTION, SOLUTION INTRAVENOUS at 22:46

## 2019-11-13 RX ADMIN — HYDROMORPHONE HYDROCHLORIDE 1 MILLIGRAM(S): 2 INJECTION INTRAMUSCULAR; INTRAVENOUS; SUBCUTANEOUS at 17:18

## 2019-11-13 RX ADMIN — HYDROMORPHONE HYDROCHLORIDE 1 MILLIGRAM(S): 2 INJECTION INTRAMUSCULAR; INTRAVENOUS; SUBCUTANEOUS at 12:00

## 2019-11-13 RX ADMIN — Medication 100 MILLIGRAM(S): at 18:04

## 2019-11-13 RX ADMIN — Medication 3: at 22:45

## 2019-11-13 RX ADMIN — Medication 400 MILLIGRAM(S): at 13:40

## 2019-11-13 NOTE — BRIEF OPERATIVE NOTE - NSICDXBRIEFPROCEDURE_GEN_ALL_CORE_FT
PROCEDURES:  Epiglottoplasty 13-Nov-2019 21:38:20  Austyn Ricardo  Tonsillectomy, adult 13-Nov-2019 21:38:11  Austyn Ricardo  Uvulopalatopharyngoplasty 13-Nov-2019 21:38:00  Austyn Ricardo

## 2019-11-14 VITALS — TEMPERATURE: 98 F

## 2019-11-14 LAB — GLUCOSE BLDC GLUCOMTR-MCNC: 202 MG/DL — HIGH (ref 70–99)

## 2019-11-14 PROCEDURE — 42145 REPAIR PALATE PHARYNX/UVULA: CPT

## 2019-11-14 PROCEDURE — 82962 GLUCOSE BLOOD TEST: CPT

## 2019-11-14 PROCEDURE — 21685 HYOID MYOTOMY & SUSPENSION: CPT

## 2019-11-14 PROCEDURE — C9399: CPT

## 2019-11-14 PROCEDURE — S2900: CPT

## 2019-11-14 PROCEDURE — 88304 TISSUE EXAM BY PATHOLOGIST: CPT

## 2019-11-14 RX ADMIN — Medication 2: at 06:28

## 2019-11-14 RX ADMIN — OXYCODONE HYDROCHLORIDE 10 MILLIGRAM(S): 5 TABLET ORAL at 03:31

## 2019-11-14 RX ADMIN — Medication 101.6 MILLIGRAM(S): at 06:29

## 2019-11-14 RX ADMIN — Medication 100 MILLIGRAM(S): at 03:31

## 2019-11-14 RX ADMIN — OXYCODONE HYDROCHLORIDE 10 MILLIGRAM(S): 5 TABLET ORAL at 04:30

## 2019-11-14 NOTE — PROGRESS NOTE ADULT - SUBJECTIVE AND OBJECTIVE BOX
ENT Progress Note    HPI: 40yo M w BRENTON s/p UPPP, tonsillotomy, epiglottopexy on 11/14. Patient admitted for overnight for airway monitoring. Doing well postoperatively, sating well on room air. Endorses throat pain, expected postop. Tolerating liquid diet. No clinical signs of aspiration. Ambulating and voiding.     PAST MEDICAL & SURGICAL HISTORY:  Insufficient sleep syndrome  DM (diabetes mellitus): type 2  Mood disturbance: pt denies  Erectile dysfunction  Tobacco abuse  BRENTON (obstructive sleep apnea)  S/P ACL repair: left x2, right x1    Allergies    No Known Allergies    Intolerances      MEDICATIONS  (STANDING):  ceFAZolin   IVPB 1000 milliGRAM(s) IV Intermittent every 8 hours  dextrose 5%. 1000 milliLiter(s) (50 mL/Hr) IV Continuous <Continuous>  dextrose 50% Injectable 12.5 Gram(s) IV Push once  dextrose 50% Injectable 25 Gram(s) IV Push once  dextrose 50% Injectable 25 Gram(s) IV Push once  insulin lispro (HumaLOG) corrective regimen sliding scale   SubCutaneous three times a day before meals  lactated ringers. 1000 milliLiter(s) (150 mL/Hr) IV Continuous <Continuous>    MEDICATIONS  (PRN):  acetaminophen    Suspension .. 650 milliGRAM(s) Oral every 6 hours PRN Mild Pain (1 - 3)  benzocaine 15 mG/menthol 3.6 mG Lozenge 1 Lozenge Oral every 2 hours PRN Sore Throat  dextrose 40% Gel 15 Gram(s) Oral once PRN Blood Glucose LESS THAN 70 milliGRAM(s)/deciliter  glucagon  Injectable 1 milliGRAM(s) IntraMuscular once PRN Glucose LESS THAN 70 milligrams/deciliter  HYDROmorphone  Injectable 1 milliGRAM(s) IV Push every 3 hours PRN Moderate Pain (4 - 6)  ondansetron    Tablet 4 milliGRAM(s) Oral every 6 hours PRN Nausea and/or Vomiting  oxyCODONE    Solution 5 milliGRAM(s) Oral every 4 hours PRN Moderate Pain (4 - 6)  oxyCODONE    Solution 10 milliGRAM(s) Oral every 4 hours PRN Severe Pain (7 - 10)        Vital Signs Last 24 Hrs  T(C): 36.9 (14 Nov 2019 05:55), Max: 37.1 (13 Nov 2019 19:00)  T(F): 98.4 (14 Nov 2019 05:55), Max: 98.7 (13 Nov 2019 19:00)  HR: 102 (14 Nov 2019 03:28) (82 - 110)  BP: 111/59 (14 Nov 2019 03:28) (110/59 - 149/89)  BP(mean): 78 (14 Nov 2019 03:28) (78 - 117)  RR: 18 (14 Nov 2019 03:28) (12 - 25)  SpO2: 92% (14 Nov 2019 03:28) (91% - 100%)    Physical Exam:  Alert, NAD. Conversant   OC/OP: sutures intact, expected exudate b/l pillars and soft palate. No bleeding or ooxing    Nonlabored Respirations SEBAS CORONADO      11-13-19 @ 07:01  -  11-14-19 @ 07:00  --------------------------------------------------------  IN: 2290 mL / OUT: 1600 mL / NET: 690 mL        A/P: 40yo M w BRENTON s/p UPPP, tonsillotomy, epiglottopexy on 11/13, progressing well postop  - Stable for discharge home   - Liquid/soft diet  - Amoxicillin, T3, medrol dosepak for home  - Follow up with Dr. Mckeon in office     d/w attending

## 2019-11-17 ENCOUNTER — APPOINTMENT (EMERGENCY)
Dept: RADIOLOGY | Facility: HOSPITAL | Age: 39
End: 2019-11-17
Payer: COMMERCIAL

## 2019-11-17 ENCOUNTER — HOSPITAL ENCOUNTER (EMERGENCY)
Facility: HOSPITAL | Age: 39
Discharge: HOME | End: 2019-11-17
Attending: EMERGENCY MEDICINE | Admitting: EMERGENCY MEDICINE
Payer: COMMERCIAL

## 2019-11-17 ENCOUNTER — APPOINTMENT (EMERGENCY)
Dept: CT IMAGING | Facility: HOSPITAL | Age: 39
End: 2019-11-17
Payer: COMMERCIAL

## 2019-11-17 VITALS
TEMPERATURE: 97.1 F | HEART RATE: 77 BPM | BODY MASS INDEX: 36.84 KG/M2 | OXYGEN SATURATION: 92 % | HEIGHT: 73 IN | DIASTOLIC BLOOD PRESSURE: 81 MMHG | WEIGHT: 278 LBS | RESPIRATION RATE: 18 BRPM | SYSTOLIC BLOOD PRESSURE: 128 MMHG

## 2019-11-17 DIAGNOSIS — G89.18 POSTOPERATIVE PAIN: Primary | ICD-10-CM

## 2019-11-17 LAB
ALBUMIN SERPL BCP-MCNC: 3.7 G/DL (ref 3.5–5)
ALP SERPL-CCNC: 64 U/L (ref 46–116)
ALT SERPL W P-5'-P-CCNC: 57 U/L (ref 12–78)
ANION GAP SERPL CALCULATED.3IONS-SCNC: 5 MMOL/L (ref 4–13)
APTT PPP: 22 SECONDS (ref 23–37)
AST SERPL W P-5'-P-CCNC: 23 U/L (ref 5–45)
BASOPHILS # BLD AUTO: 0.03 THOUSANDS/ΜL (ref 0–0.1)
BASOPHILS NFR BLD AUTO: 0 % (ref 0–1)
BILIRUB SERPL-MCNC: 0.7 MG/DL (ref 0.2–1)
BUN SERPL-MCNC: 18 MG/DL (ref 5–25)
CALCIUM SERPL-MCNC: 9.2 MG/DL (ref 8.3–10.1)
CHLORIDE SERPL-SCNC: 100 MMOL/L (ref 100–108)
CO2 SERPL-SCNC: 28 MMOL/L (ref 21–32)
CREAT SERPL-MCNC: 0.96 MG/DL (ref 0.6–1.3)
EOSINOPHIL # BLD AUTO: 0.13 THOUSAND/ΜL (ref 0–0.61)
EOSINOPHIL NFR BLD AUTO: 1 % (ref 0–6)
ERYTHROCYTE [DISTWIDTH] IN BLOOD BY AUTOMATED COUNT: 12 % (ref 11.6–15.1)
GFR SERPL CREATININE-BSD FRML MDRD: 100 ML/MIN/1.73SQ M
GLUCOSE SERPL-MCNC: 168 MG/DL (ref 65–140)
HCT VFR BLD AUTO: 49.5 % (ref 36.5–49.3)
HGB BLD-MCNC: 16.9 G/DL (ref 12–17)
IMM GRANULOCYTES # BLD AUTO: 0.09 THOUSAND/UL (ref 0–0.2)
IMM GRANULOCYTES NFR BLD AUTO: 1 % (ref 0–2)
INR PPP: 0.97 (ref 0.84–1.19)
LYMPHOCYTES # BLD AUTO: 2.98 THOUSANDS/ΜL (ref 0.6–4.47)
LYMPHOCYTES NFR BLD AUTO: 28 % (ref 14–44)
MCH RBC QN AUTO: 32.1 PG (ref 26.8–34.3)
MCHC RBC AUTO-ENTMCNC: 34.1 G/DL (ref 31.4–37.4)
MCV RBC AUTO: 94 FL (ref 82–98)
MONOCYTES # BLD AUTO: 0.81 THOUSAND/ΜL (ref 0.17–1.22)
MONOCYTES NFR BLD AUTO: 8 % (ref 4–12)
NEUTROPHILS # BLD AUTO: 6.66 THOUSANDS/ΜL (ref 1.85–7.62)
NEUTS SEG NFR BLD AUTO: 62 % (ref 43–75)
NRBC BLD AUTO-RTO: 0 /100 WBCS
PLATELET # BLD AUTO: 190 THOUSANDS/UL (ref 149–390)
PMV BLD AUTO: 9.5 FL (ref 8.9–12.7)
POTASSIUM SERPL-SCNC: 3.9 MMOL/L (ref 3.5–5.3)
PROT SERPL-MCNC: 7.5 G/DL (ref 6.4–8.2)
PROTHROMBIN TIME: 12.9 SECONDS (ref 11.6–14.5)
RBC # BLD AUTO: 5.27 MILLION/UL (ref 3.88–5.62)
SODIUM SERPL-SCNC: 133 MMOL/L (ref 136–145)
TROPONIN I SERPL-MCNC: <0.02 NG/ML
WBC # BLD AUTO: 10.7 THOUSAND/UL (ref 4.31–10.16)

## 2019-11-17 PROCEDURE — 99285 EMERGENCY DEPT VISIT HI MDM: CPT | Performed by: EMERGENCY MEDICINE

## 2019-11-17 PROCEDURE — 70450 CT HEAD/BRAIN W/O DYE: CPT

## 2019-11-17 PROCEDURE — 84484 ASSAY OF TROPONIN QUANT: CPT | Performed by: EMERGENCY MEDICINE

## 2019-11-17 PROCEDURE — 96361 HYDRATE IV INFUSION ADD-ON: CPT

## 2019-11-17 PROCEDURE — 80053 COMPREHEN METABOLIC PANEL: CPT | Performed by: EMERGENCY MEDICINE

## 2019-11-17 PROCEDURE — 96375 TX/PRO/DX INJ NEW DRUG ADDON: CPT

## 2019-11-17 PROCEDURE — 71045 X-RAY EXAM CHEST 1 VIEW: CPT

## 2019-11-17 PROCEDURE — 99284 EMERGENCY DEPT VISIT MOD MDM: CPT

## 2019-11-17 PROCEDURE — 85730 THROMBOPLASTIN TIME PARTIAL: CPT | Performed by: EMERGENCY MEDICINE

## 2019-11-17 PROCEDURE — 70491 CT SOFT TISSUE NECK W/DYE: CPT

## 2019-11-17 PROCEDURE — 96374 THER/PROPH/DIAG INJ IV PUSH: CPT

## 2019-11-17 PROCEDURE — 85025 COMPLETE CBC W/AUTO DIFF WBC: CPT | Performed by: EMERGENCY MEDICINE

## 2019-11-17 PROCEDURE — 93005 ELECTROCARDIOGRAM TRACING: CPT

## 2019-11-17 PROCEDURE — 85610 PROTHROMBIN TIME: CPT | Performed by: EMERGENCY MEDICINE

## 2019-11-17 RX ORDER — ONDANSETRON 2 MG/ML
4 INJECTION INTRAMUSCULAR; INTRAVENOUS ONCE
Status: COMPLETED | OUTPATIENT
Start: 2019-11-17 | End: 2019-11-17

## 2019-11-17 RX ORDER — FENTANYL CITRATE 50 UG/ML
50 INJECTION, SOLUTION INTRAMUSCULAR; INTRAVENOUS ONCE
Status: COMPLETED | OUTPATIENT
Start: 2019-11-17 | End: 2019-11-17

## 2019-11-17 RX ORDER — DEXAMETHASONE 2 MG/1
TABLET ORAL
Qty: 5 TABLET | Refills: 0 | Status: SHIPPED | OUTPATIENT
Start: 2019-11-17

## 2019-11-17 RX ORDER — DEXAMETHASONE SODIUM PHOSPHATE 10 MG/ML
10 INJECTION, SOLUTION INTRAMUSCULAR; INTRAVENOUS ONCE
Status: COMPLETED | OUTPATIENT
Start: 2019-11-17 | End: 2019-11-17

## 2019-11-17 RX ORDER — HYDROCODONE BITARTRATE AND ACETAMINOPHEN 5; 325 MG/1; MG/1
2 TABLET ORAL ONCE
Status: COMPLETED | OUTPATIENT
Start: 2019-11-17 | End: 2019-11-17

## 2019-11-17 RX ORDER — KETOROLAC TROMETHAMINE 30 MG/ML
30 INJECTION, SOLUTION INTRAMUSCULAR; INTRAVENOUS ONCE
Status: COMPLETED | OUTPATIENT
Start: 2019-11-17 | End: 2019-11-17

## 2019-11-17 RX ORDER — HYDROCODONE BITARTRATE AND ACETAMINOPHEN 5; 325 MG/1; MG/1
1 TABLET ORAL EVERY 6 HOURS PRN
Qty: 20 TABLET | Refills: 0 | Status: SHIPPED | OUTPATIENT
Start: 2019-11-17

## 2019-11-17 RX ADMIN — SODIUM CHLORIDE 1000 ML: 0.9 INJECTION, SOLUTION INTRAVENOUS at 16:48

## 2019-11-17 RX ADMIN — FENTANYL CITRATE 50 MCG: 50 INJECTION INTRAMUSCULAR; INTRAVENOUS at 16:42

## 2019-11-17 RX ADMIN — KETOROLAC TROMETHAMINE 30 MG: 30 INJECTION, SOLUTION INTRAMUSCULAR at 18:30

## 2019-11-17 RX ADMIN — DEXAMETHASONE SODIUM PHOSPHATE 10 MG: 10 INJECTION, SOLUTION INTRAMUSCULAR; INTRAVENOUS at 16:42

## 2019-11-17 RX ADMIN — IOHEXOL 85 ML: 350 INJECTION, SOLUTION INTRAVENOUS at 17:13

## 2019-11-17 RX ADMIN — ONDANSETRON 4 MG: 2 INJECTION INTRAMUSCULAR; INTRAVENOUS at 16:42

## 2019-11-17 RX ADMIN — HYDROCODONE BITARTRATE AND ACETAMINOPHEN 2 TABLET: 5; 325 TABLET ORAL at 19:26

## 2019-11-17 NOTE — ED PROVIDER NOTES
History  Chief Complaint   Patient presents with    Post-op Problem     had surgery WED for CPAP per pt "had throat cleaed out" so he does not have to use C-PAP  Currently on amooxicillin and tylenol with codine-had 1 5hrs ago  HAs not been taking Steroids due to makes BS elevated  20-year-old male presents postop day 4 from ? UPPP (uvulopalatopharyngoplasty) throat surgery to avoid use of CPAP  Was done at the [facility] in [place] ([phone]) by Dr Minesh Rios ([phone])  next follow up appointment is 11/22/19; he has been doing well until today when he had markedly increased pain in the back of the throat radiating to his ears he is complaining of a head pressure and pain centered behind the ears and sinuses  He denies any fever he has had no voice change he prefers to use a lower voice he has been able to tolerate liquids he denies any shortness of breath pleuritic pain or chest pain  Pain is been managed with Tylenol No   3 up until now he has been taking amoxicillin postoperatively he was prescribed a steroid but elected not to take it because of concern  that it causes his blood sugars declined to 300  He is not managed on insulin  Patient denies any lightheadedness; patient has had minimal bleeding he says on occasion he has maybe a drop of blood if he coughs he does not have significant coughing  He denies any nausea vomiting there is no abdominal pain no diarrhea  He has had no rash  Prior to Admission Medications   Prescriptions Last Dose Informant Patient Reported?  Taking?   acetaminophen (TYLENOL) 325 mg tablet   No Yes   Sig: Take 2 tablets (650 mg total) by mouth every 6 (six) hours as needed (pain, fever)   metFORMIN (GLUCOPHAGE) 500 mg tablet Not Taking at Unknown time  No No   Sig: Take 1 tablet (500 mg total) by mouth daily   Patient not taking: Reported on 8/5/2019   ofloxacin (OCUFLOX) 0 3 % ophthalmic solution Not Taking at Unknown time  No No   Sig: Administer 5 drops into ears 4 (four) times a day   Patient not taking: Reported on 10/30/2019   sildenafil (VIAGRA) 100 mg tablet Not Taking at Unknown time  No No   Sig: Take 1 tablet (100 mg total) by mouth daily as needed for erectile dysfunction   Patient not taking: Reported on 10/30/2019      Facility-Administered Medications: None       Past Medical History:   Diagnosis Date    Diabetes mellitus ([facility] [address] )     History of throat surgery     Sleep apnea        Past Surgical History:   Procedure Laterality Date    KNEE SURGERY         Family History   Problem Relation Age of Onset    No Known Problems Mother     No Known Problems Father      I have reviewed and agree with the history as documented  Social History     Tobacco Use    Smoking status: Current Every Day Smoker     Packs/day: 1 00     Types: Cigarettes    Smokeless tobacco: Never Used   Substance Use Topics    Alcohol use: Yes     Frequency: 4 or more times a week     Drinks per session: 5 or 6     Binge frequency: Monthly     Comment: SOCIAL    Drug use: No        Review of Systems   Constitutional: Positive for activity change and appetite change  Negative for chills, diaphoresis and fever  HENT: Positive for ear pain, sinus pressure, sinus pain, sore throat and trouble swallowing  Negative for congestion, ear discharge, nosebleeds, postnasal drip and voice change  Eyes: Negative for discharge  Respiratory: Negative for cough and shortness of breath  Cardiovascular: Negative for chest pain  Gastrointestinal: Negative for abdominal pain, diarrhea, nausea and vomiting  Genitourinary: Negative for difficulty urinating  Musculoskeletal: Negative for back pain, neck pain and neck stiffness  Skin: Negative for rash  Neurological: Negative for dizziness, speech difficulty, weakness, light-headedness, numbness and headaches  Psychiatric/Behavioral: Negative for confusion     All other systems reviewed and are negative  Physical Exam  Physical Exam   Constitutional: He is oriented to person, place, and time  He appears well-developed and well-nourished  He appears distressed (mild secondary to pain)  Tolerating oral secretions no drooling voice is clear and not raspy   HENT:   Head: Normocephalic and atraumatic  Right Ear: External ear normal    Left Ear: External ear normal    Surgical absence of the uvula  Patient has evidence of some eschars along the tonsillar pillars and the tonsillar fossa is no tonsillar tissue was evident there is no evidence of bleeding  He has no stridor no drooling no other intraoral lesions  No tenderness with palpation of the sinuses  Eyes: Pupils are equal, round, and reactive to light  Conjunctivae are normal  Right eye exhibits no discharge  Neck: Normal range of motion  Neck supple  No posterior lymphadenopathy no stridor   Cardiovascular: Normal rate, regular rhythm, normal heart sounds and intact distal pulses  Pulmonary/Chest: Effort normal and breath sounds normal  No stridor  No respiratory distress  He has no wheezes  He has no rales  Abdominal: Soft  Bowel sounds are normal  He exhibits no distension and no mass  There is no tenderness  There is no rebound and no guarding  Musculoskeletal: Normal range of motion  He exhibits no edema, tenderness or deformity  Lymphadenopathy:     He has no cervical adenopathy  Neurological: He is alert and oriented to person, place, and time  No cranial nerve deficit or sensory deficit  He exhibits normal muscle tone  Coordination normal    Gait steady   Skin: Skin is warm and dry  Capillary refill takes less than 2 seconds  No rash noted  He is not diaphoretic  Psychiatric: He has a normal mood and affect         Vital Signs  ED Triage Vitals [11/17/19 1551]   Temperature Pulse Respirations Blood Pressure SpO2   (!) 97 1 °F (36 2 °C) 101 18 126/84 96 %      Temp Source Heart Rate Source Patient Position - Orthostatic VS BP Location FiO2 (%)   Temporal Monitor Sitting Right arm --      Pain Score       7           Vitals:    11/17/19 1715 11/17/19 1730 11/17/19 1800 11/17/19 1830   BP: 126/77 127/76 117/78 128/81   Pulse: 92 81 75 77   Patient Position - Orthostatic VS: Lying Lying Lying Lying         Visual Acuity      ED Medications  Medications   sodium chloride 0 9 % bolus 1,000 mL (0 mL Intravenous Stopped 11/17/19 1801)   ondansetron (ZOFRAN) injection 4 mg (4 mg Intravenous Given 11/17/19 1642)   fentanyl citrate (PF) 100 MCG/2ML 50 mcg (50 mcg Intravenous Given 11/17/19 1642)   dexamethasone (PF) (DECADRON) injection 10 mg (10 mg Intravenous Given 11/17/19 1642)   iohexol (OMNIPAQUE) 350 MG/ML injection (SINGLE-DOSE) 85 mL (85 mL Intravenous Given 11/17/19 1713)   ketorolac (TORADOL) injection 30 mg (30 mg Intravenous Given 11/17/19 1830)   HYDROcodone-acetaminophen (NORCO) 5-325 mg per tablet 2 tablet (2 tablets Oral Given 11/17/19 1926)       Diagnostic Studies  Results Reviewed     Procedure Component Value Units Date/Time    Troponin I [[id number]]  (Normal) Collected:  11/17/19 1627    Lab Status:  Final result Specimen:  Blood from Arm, Left Updated:  11/17/19 1653     Troponin I <0 02 ng/mL     Comprehensive metabolic panel [[id number]]  (Abnormal) Collected:  11/17/19 1627    Lab Status:  Final result Specimen:  Blood from Arm, Left Updated:  11/17/19 1653     Sodium 133 mmol/L      Potassium 3 9 mmol/L      Chloride 100 mmol/L      CO2 28 mmol/L      ANION GAP 5 mmol/L      BUN 18 mg/dL      Creatinine 0 96 mg/dL      Glucose 168 mg/dL      Calcium 9 2 mg/dL      AST 23 U/L      ALT 57 U/L      Alkaline Phosphatase 64 U/L      Total Protein 7 5 g/dL      Albumin 3 7 g/dL      Total Bilirubin 0 70 mg/dL      eGFR 100 ml/min/1 73sq m     Narrative:       Cheng guidelines for Chronic Kidney Disease (CKD):     Stage 1 with normal or high GFR (GFR > 90 mL/min/1 73 square meters)    Stage 2 Mild CKD (GFR = 60-89 mL/min/1 73 square meters)    Stage 3A Moderate CKD (GFR = 45-59 mL/min/1 73 square meters)    Stage 3B Moderate CKD (GFR = 30-44 mL/min/1 73 square meters)    Stage 4 Severe CKD (GFR = 15-29 mL/min/1 73 square meters)    Stage 5 End Stage CKD (GFR <15 mL/min/1 73 square meters)  Note: GFR calculation is accurate only with a steady state creatinine    Protime-INR [[id number]]  (Normal) Collected:  11/17/19 1627    Lab Status:  Final result Specimen:  Blood from Arm, Left Updated:  11/17/19 1649     Protime 12 9 seconds      INR 0 97    APTT [[id number]]  (Abnormal) Collected:  11/17/19 1627    Lab Status:  Final result Specimen:  Blood from Arm, Left Updated:  11/17/19 1649     PTT 22 seconds     CBC and differential [[id number]]  (Abnormal) Collected:  11/17/19 1627    Lab Status:  Final result Specimen:  Blood from Arm, Left Updated:  11/17/19 1634     WBC 10 70 Thousand/uL      RBC 5 27 Million/uL      Hemoglobin 16 9 g/dL      Hematocrit 49 5 %      MCV 94 fL      MCH 32 1 pg      MCHC 34 1 g/dL      RDW 12 0 %      MPV 9 5 fL      Platelets 047 Thousands/uL      nRBC 0 /100 WBCs      Neutrophils Relative 62 %      Immat GRANS % 1 %      Lymphocytes Relative 28 %      Monocytes Relative 8 %      Eosinophils Relative 1 %      Basophils Relative 0 %      Neutrophils Absolute 6 66 Thousands/µL      Immature Grans Absolute 0 09 Thousand/uL      Lymphocytes Absolute 2 98 Thousands/µL      Monocytes Absolute 0 81 Thousand/µL      Eosinophils Absolute 0 13 Thousand/µL      Basophils Absolute 0 03 Thousands/µL                  CT head without contrast   Final Result by Patricia Katz MD (11/17 1747)      No acute intracranial abnormality  Workstation performed: [id number]         CT soft tissue neck   Final Result by Patricia Katz MD (11/17 1754)      Postsurgical changes of uvulopalatopharyngoplasty  No pharyngeal or laryngeal airway obstruction        No cervical mass lesion or adenopathy  Workstation performed: [id number]         XR chest 1 view portable   ED Interpretation by Yohana Burton MD (56/38 9230)   Read by me; Radiologist to provide formal interpretation  No acute process      Final Result by Leisa Guerra MD (11/17 2131)      No acute cardiopulmonary disease  Workstation performed: [id number]                    Procedures  ECG 12 Lead Documentation Only  Date/Time: 11/17/2019 4:27 PM  Performed by: Yohana Burton MD  Authorized by: Yohana Burton MD     Indications / Diagnosis:  Throat pain  ECG reviewed by me, the ED Provider: yes    Patient location:  ED  Previous ECG:     Previous ECG:  Compared to current    Comparison ECG info:  11/08/19    Similarity:  Changes noted  Rate:     ECG rate:  99    ECG rate assessment: normal    Rhythm:     Rhythm: sinus rhythm    QRS:     QRS axis:  Normal  Comments:      Nonspecific ST changes inferiorly;            ED Course  ED Course as of Nov 18 0101   Sun Nov 17, 2019   0556 Patient feels markedly improved tolerating liquids; extensively reviewed labs, CT, cxr provided with copies of labs to facilitate follow up  Will contact Dr Zenobia Goodrich answ service to coordinate care recommend additional dose of dexamethasone continued amoxicillin and d/c of t#3 switch to [place] Dr Zenobia Goodrich phone number changed to [phone] option #3      1920 Patient has issues to take care of at home does not want to wait for ENT consult  Will d/c home with below plan will contact Dr Umm Gonsalez with any problems                                  MDM  Number of Diagnoses or Management Options  Diagnosis management comments: Mdm:  No clinical evidence of postoperative pharyngeal bleeding    No stridor or airway compromise will proceed with CT of the head soft tissue of the neck chest x-ray EKG to evaluate for possibility of retropharyngeal and space-occupying lesions such as bleeding or infection hydrate the patient a did strike reviewed the patient I do recommend initiation of steroids to help with tissue edema we can cover elevated blood sugars with insulin will be in contact with the surgeon to discuss results and determine disposition  Disposition  Final diagnoses:   Postoperative pain - s/p UPPP post op day #4     Time reflects when diagnosis was documented in both MDM as applicable and the Disposition within this note     Time User Action Codes Description Comment    11/17/2019  7:22 PM Batool Do Add [G89 18] Postoperative pain     11/17/2019  7:22 PM Francisco Mcgregor [V38 24] Postoperative pain s/p UPPP post op day #4      ED Disposition     ED Disposition Condition Date/Time Comment    Discharge Stable Sun Nov 17, 2019  7:22 PM Alphonzo Osgood discharge to home/self care  Follow-up Information     Follow up With Specialties Details Why Contact Info    Toya De La O [phone]  option #3 any concerns    Keep follow up appointment on 11/22 as scheduled          Discharge Medication List as of 11/17/2019  7:27 PM      START taking these medications    Details   dexamethasone (DECADRON) 2 mg tablet Take with food   Take one time dose of 10mg by mouth tomorrow, Normal      HYDROcodone-acetaminophen (NORCO) 5-325 mg per tablet Take 1 tablet by mouth every 6 (six) hours as needed for pain for up to 20 dosesMax Daily Amount: 4 tablets, Starting Sun 11/17/2019, Normal         CONTINUE these medications which have NOT CHANGED    Details   acetaminophen (TYLENOL) 325 mg tablet Take 2 tablets (650 mg total) by mouth every 6 (six) hours as needed (pain, fever), Starting Mon 8/5/2019, Print      metFORMIN (GLUCOPHAGE) 500 mg tablet Take 1 tablet (500 mg total) by mouth daily, Starting Mon 5/13/2019, Normal      ofloxacin (OCUFLOX) 0 3 % ophthalmic solution Administer 5 drops into ears 4 (four) times a day, Starting Mon 8/5/2019, Print      sildenafil (VIAGRA) 100 mg tablet Take 1 tablet (100 mg total) by mouth daily as needed for erectile dysfunction, Starting Wed 10/2/2019, Normal           No discharge procedures on file      ED Provider  Electronically Signed by           Nelly Matute MD  11/18/19 7410

## 2019-11-17 NOTE — ED NOTES
Patient is sleeping comfortably in bed   Sats do drop to 89% while sleeping and then go back up to 93%     Chetan Agudelo RN  11/17/19 3856

## 2019-11-18 LAB
ATRIAL RATE: 99 BPM
P AXIS: 55 DEGREES
PR INTERVAL: 130 MS
QRS AXIS: 62 DEGREES
QRSD INTERVAL: 86 MS
QT INTERVAL: 338 MS
QTC INTERVAL: 433 MS
SURGICAL PATHOLOGY STUDY: SIGNIFICANT CHANGE UP
T WAVE AXIS: 59 DEGREES
VENTRICULAR RATE: 99 BPM

## 2019-11-18 PROCEDURE — 93010 ELECTROCARDIOGRAM REPORT: CPT | Performed by: INTERNAL MEDICINE

## 2019-11-18 NOTE — DISCHARGE INSTRUCTIONS
Hold tylenol #3   Switch to norco  Norco for breakthrough pain; no extra tylenol with Norco use miralax to stay regular; No drinking driving or heavy machinery use with Norco    No drinking, driving or heavy machinery use for 6 hours after discharge  Take 10mg one time dose of dexamethasone tomorrow  Plenty of fliud  Return with fever, trouble breathing, trouble swallowing    No voice change inability keep fluids down or any new or worsening symptoms

## 2019-11-19 ENCOUNTER — TELEPHONE (OUTPATIENT)
Dept: UROLOGY | Facility: MEDICAL CENTER | Age: 39
End: 2019-11-19

## 2019-11-19 ENCOUNTER — OFFICE VISIT (OUTPATIENT)
Dept: UROLOGY | Facility: MEDICAL CENTER | Age: 39
End: 2019-11-19
Payer: COMMERCIAL

## 2019-11-19 VITALS
DIASTOLIC BLOOD PRESSURE: 84 MMHG | WEIGHT: 279 LBS | HEART RATE: 94 BPM | SYSTOLIC BLOOD PRESSURE: 110 MMHG | HEIGHT: 73 IN | BODY MASS INDEX: 36.98 KG/M2

## 2019-11-19 DIAGNOSIS — N52.9 ERECTILE DYSFUNCTION, UNSPECIFIED ERECTILE DYSFUNCTION TYPE: Primary | ICD-10-CM

## 2019-11-19 LAB
SL AMB  POCT GLUCOSE, UA: NORMAL
SL AMB LEUKOCYTE ESTERASE,UA: NORMAL
SL AMB POCT BILIRUBIN,UA: NORMAL
SL AMB POCT BLOOD,UA: NORMAL
SL AMB POCT CLARITY,UA: CLEAR
SL AMB POCT COLOR,UA: NORMAL
SL AMB POCT KETONES,UA: NORMAL
SL AMB POCT NITRITE,UA: NORMAL
SL AMB POCT PH,UA: 6
SL AMB POCT SPECIFIC GRAVITY,UA: >=1.03
SL AMB POCT URINE PROTEIN: NORMAL
SL AMB POCT UROBILINOGEN: 0.2

## 2019-11-19 PROCEDURE — 99213 OFFICE O/P EST LOW 20 MIN: CPT | Performed by: UROLOGY

## 2019-11-19 PROCEDURE — 81003 URINALYSIS AUTO W/O SCOPE: CPT | Performed by: UROLOGY

## 2019-11-19 NOTE — TELEPHONE ENCOUNTER
Spoke with pt's wife and told her we can't discuss  with her since she is not on his HIPAA consent  Told her he can call us with questions or concerns

## 2019-11-19 NOTE — PROGRESS NOTES
Assessment/Plan:  1  Hypogonadism-the patient and I had a long discussion concerning options for treatment including Androderm AndroGel IM testosterone and testopel  The patient works in [employer] and wishes to avoid anything topical and therefore has opted to proceed with testosterone pellet implants  Patient is aware of the process in the possibility of infection or extrusion of the pellets  This will be arranged  2  Erectile dysfunction-patient has not tried sildenafil at this point  He is encouraged to do so to identify whether not he gets a good reaction and any side effects  No problem-specific Assessment & Plan notes found for this encounter  Diagnoses and all orders for this visit:    Erectile dysfunction, unspecified erectile dysfunction type  -     POCT urine dip auto non-scope          Subjective:      Patient ID: Lloyd Busby is a 46 y o  male  HPI  36-year-old male who returns to the office to discuss treatment for hypogonadism  His total testosterone was only 200 and he has side effects of decreased energy level fatigue and low sex drive as well as erectile dysfunction  He was given a prescription for Viagra to try for ED however he has not tried that at this point  Patient returns to discuss options for testosterone replacement therapy    The following portions of the patient's history were reviewed and updated as appropriate: allergies, current medications, past family history, past medical history, past social history, past surgical history and problem list     Review of Systems   HENT: Positive for congestion  Respiratory:        Pppe surgery for sleep apnea   All other systems reviewed and are negative  Objective:      /84 (BP Location: Left arm, Patient Position: Sitting, Cuff Size: Adult)   Pulse 94   Ht 6' 1" (1 854 m)   Wt 127 kg (279 lb)   BMI 36 81 kg/m²          Physical Exam   Constitutional: He is oriented to person, place, and time   He appears well-developed and well-nourished  No distress  HENT:   Head: Normocephalic and atraumatic  Eyes: EOM are normal    Neck: Neck supple  Pulmonary/Chest: Effort normal  No respiratory distress  Neurological: He is alert and oriented to person, place, and time  Psychiatric: He has a normal mood and affect  His behavior is normal  Judgment and thought content normal    Vitals reviewed

## 2019-11-19 NOTE — TELEPHONE ENCOUNTER
Patient of Dr Mercedez Marcial see in [place]  Patients wife calling in regards to today's appointment and would like to discuss and medication or instructions given      She can be reached at [phone]

## 2019-11-20 ENCOUNTER — TELEPHONE (OUTPATIENT)
Dept: UROLOGY | Facility: MEDICAL CENTER | Age: 39
End: 2019-11-20

## 2019-11-20 NOTE — TELEPHONE ENCOUNTER
Testopel no auth needed office stock ok to use spoke with Virginia Chávez at [facility] ref#[id number]  Thanks  Lou Givens No

## 2019-11-22 PROBLEM — F51.12 INSUFFICIENT SLEEP SYNDROME: Chronic | Status: ACTIVE | Noted: 2019-11-12

## 2019-11-22 PROBLEM — Z72.0 TOBACCO USE: Chronic | Status: ACTIVE | Noted: 2019-11-12

## 2019-11-22 PROBLEM — E11.9 TYPE 2 DIABETES MELLITUS WITHOUT COMPLICATIONS: Chronic | Status: ACTIVE | Noted: 2019-11-12

## 2019-11-22 PROBLEM — R45.86 EMOTIONAL LABILITY: Chronic | Status: ACTIVE | Noted: 2019-11-12

## 2019-11-22 PROBLEM — N52.9 MALE ERECTILE DYSFUNCTION, UNSPECIFIED: Chronic | Status: ACTIVE | Noted: 2019-11-12

## 2019-11-22 PROBLEM — G47.33 OBSTRUCTIVE SLEEP APNEA (ADULT) (PEDIATRIC): Chronic | Status: ACTIVE | Noted: 2019-11-12

## 2019-11-23 ENCOUNTER — APPOINTMENT (EMERGENCY)
Dept: CT IMAGING | Facility: HOSPITAL | Age: 39
End: 2019-11-23
Payer: COMMERCIAL

## 2019-11-23 ENCOUNTER — HOSPITAL ENCOUNTER (EMERGENCY)
Facility: HOSPITAL | Age: 39
Discharge: HOME | End: 2019-11-23
Attending: EMERGENCY MEDICINE
Payer: COMMERCIAL

## 2019-11-23 VITALS
BODY MASS INDEX: 37.57 KG/M2 | SYSTOLIC BLOOD PRESSURE: 119 MMHG | OXYGEN SATURATION: 94 % | HEART RATE: 88 BPM | HEIGHT: 73 IN | WEIGHT: 283.51 LBS | RESPIRATION RATE: 20 BRPM | TEMPERATURE: 97.8 F | DIASTOLIC BLOOD PRESSURE: 65 MMHG

## 2019-11-23 DIAGNOSIS — L98.8 FISTULA: Primary | ICD-10-CM

## 2019-11-23 LAB
ANION GAP SERPL CALCULATED.3IONS-SCNC: 10 MMOL/L (ref 4–13)
BASOPHILS # BLD AUTO: 0.04 THOUSANDS/ΜL (ref 0–0.1)
BASOPHILS NFR BLD AUTO: 0 % (ref 0–1)
BUN SERPL-MCNC: 13 MG/DL (ref 5–25)
CALCIUM SERPL-MCNC: 9.2 MG/DL (ref 8.3–10.1)
CHLORIDE SERPL-SCNC: 98 MMOL/L (ref 100–108)
CO2 SERPL-SCNC: 28 MMOL/L (ref 21–32)
CREAT SERPL-MCNC: 0.9 MG/DL (ref 0.6–1.3)
CRP SERPL HS-MCNC: 30 MG/L
EOSINOPHIL # BLD AUTO: 0.17 THOUSAND/ΜL (ref 0–0.61)
EOSINOPHIL NFR BLD AUTO: 2 % (ref 0–6)
ERYTHROCYTE [DISTWIDTH] IN BLOOD BY AUTOMATED COUNT: 11.9 % (ref 11.6–15.1)
GFR SERPL CREATININE-BSD FRML MDRD: 108 ML/MIN/1.73SQ M
GLUCOSE SERPL-MCNC: 159 MG/DL (ref 65–140)
HCT VFR BLD AUTO: 48 % (ref 36.5–49.3)
HGB BLD-MCNC: 16.4 G/DL (ref 12–17)
IMM GRANULOCYTES # BLD AUTO: 0.06 THOUSAND/UL (ref 0–0.2)
IMM GRANULOCYTES NFR BLD AUTO: 1 % (ref 0–2)
LYMPHOCYTES # BLD AUTO: 4.11 THOUSANDS/ΜL (ref 0.6–4.47)
LYMPHOCYTES NFR BLD AUTO: 37 % (ref 14–44)
MAGNESIUM SERPL-MCNC: 1.9 MG/DL (ref 1.6–2.6)
MCH RBC QN AUTO: 32.1 PG (ref 26.8–34.3)
MCHC RBC AUTO-ENTMCNC: 34.2 G/DL (ref 31.4–37.4)
MCV RBC AUTO: 94 FL (ref 82–98)
MONOCYTES # BLD AUTO: 0.87 THOUSAND/ΜL (ref 0.17–1.22)
MONOCYTES NFR BLD AUTO: 8 % (ref 4–12)
NEUTROPHILS # BLD AUTO: 5.94 THOUSANDS/ΜL (ref 1.85–7.62)
NEUTS SEG NFR BLD AUTO: 52 % (ref 43–75)
NRBC BLD AUTO-RTO: 0 /100 WBCS
PLATELET # BLD AUTO: 211 THOUSANDS/UL (ref 149–390)
PMV BLD AUTO: 9.2 FL (ref 8.9–12.7)
POTASSIUM SERPL-SCNC: 4.6 MMOL/L (ref 3.5–5.3)
RBC # BLD AUTO: 5.11 MILLION/UL (ref 3.88–5.62)
SODIUM SERPL-SCNC: 136 MMOL/L (ref 136–145)
WBC # BLD AUTO: 11.19 THOUSAND/UL (ref 4.31–10.16)

## 2019-11-23 PROCEDURE — 36415 COLL VENOUS BLD VENIPUNCTURE: CPT | Performed by: EMERGENCY MEDICINE

## 2019-11-23 PROCEDURE — 80048 BASIC METABOLIC PNL TOTAL CA: CPT | Performed by: EMERGENCY MEDICINE

## 2019-11-23 PROCEDURE — 99284 EMERGENCY DEPT VISIT MOD MDM: CPT | Performed by: EMERGENCY MEDICINE

## 2019-11-23 PROCEDURE — 85025 COMPLETE CBC W/AUTO DIFF WBC: CPT | Performed by: EMERGENCY MEDICINE

## 2019-11-23 PROCEDURE — 99284 EMERGENCY DEPT VISIT MOD MDM: CPT

## 2019-11-23 PROCEDURE — 86141 C-REACTIVE PROTEIN HS: CPT | Performed by: EMERGENCY MEDICINE

## 2019-11-23 PROCEDURE — 96374 THER/PROPH/DIAG INJ IV PUSH: CPT

## 2019-11-23 PROCEDURE — 83735 ASSAY OF MAGNESIUM: CPT | Performed by: EMERGENCY MEDICINE

## 2019-11-23 PROCEDURE — 70491 CT SOFT TISSUE NECK W/DYE: CPT

## 2019-11-23 RX ORDER — KETOROLAC TROMETHAMINE 30 MG/ML
15 INJECTION, SOLUTION INTRAMUSCULAR; INTRAVENOUS ONCE
Status: COMPLETED | OUTPATIENT
Start: 2019-11-23 | End: 2019-11-23

## 2019-11-23 RX ORDER — AMOXICILLIN AND CLAVULANATE POTASSIUM 875; 125 MG/1; MG/1
1 TABLET, FILM COATED ORAL EVERY 12 HOURS
Qty: 20 TABLET | Refills: 0 | Status: SHIPPED | OUTPATIENT
Start: 2019-11-23 | End: 2019-12-03

## 2019-11-23 RX ORDER — AMOXICILLIN AND CLAVULANATE POTASSIUM 875; 125 MG/1; MG/1
1 TABLET, FILM COATED ORAL ONCE
Status: COMPLETED | OUTPATIENT
Start: 2019-11-23 | End: 2019-11-23

## 2019-11-23 RX ADMIN — KETOROLAC TROMETHAMINE 15 MG: 30 INJECTION, SOLUTION INTRAMUSCULAR at 03:10

## 2019-11-23 RX ADMIN — AMOXICILLIN AND CLAVULANATE POTASSIUM 1 TABLET: 875; 125 TABLET, FILM COATED ORAL at 05:44

## 2019-11-23 RX ADMIN — IOHEXOL 85 ML: 350 INJECTION, SOLUTION INTRAVENOUS at 03:28

## 2019-11-23 NOTE — DISCHARGE INSTRUCTIONS
This is not necessarily an abscess at present, although it may be turning into 1  Your surgeon needs to know about the abnormal tract, or fistula  We gave you a disc with your CT scan images as well as copies of the reports

## 2019-11-23 NOTE — ED PROVIDER NOTES
History  Chief Complaint   Patient presents with    Post-op Problem     pt states he had a surgical procedure for sleep apnea last week  today c/o amterior neck swelling, redness, and pain      Patient: Branden Carrillo  39 y o /male  YOB: 1980  MRN: [id number]  PCP: Harris Gagonn, DO  Date of evaluation: 11/23/2019    (N B   [address] may have been used in the preparation of this document  Occasional wrong word or "sound-alike" substitutions may have occurred due to the inherent limitations of voice recognition software  Interpretation should be guided by context )    On 11/13 he had a uvulopalatopharyngoplasty at [facility] by Dr Guerline De La Fuente  Now he has noticed a greadually growing lump on the anterior aspect of his neck  He thinks it's where they stuck a needle in him  No redness or drainage  No fever  History provided by:  Patient and relative      Prior to Admission Medications   Prescriptions Last Dose Informant Patient Reported? Taking? HYDROcodone-acetaminophen (NORCO) 5-325 mg per tablet  Self No No   Sig: Take 1 tablet by mouth every 6 (six) hours as needed for pain for up to 20 dosesMax Daily Amount: 4 tablets   acetaminophen (TYLENOL) 325 mg tablet  Self No No   Sig: Take 2 tablets (650 mg total) by mouth every 6 (six) hours as needed (pain, fever)   dexamethasone (DECADRON) 2 mg tablet  Self No No   Sig: Take with food   Take one time dose of 10mg by mouth tomorrow   Patient not taking: Reported on 11/19/2019   metFORMIN (GLUCOPHAGE) 500 mg tablet  Self No No   Sig: Take 1 tablet (500 mg total) by mouth daily   ofloxacin (OCUFLOX) 0 3 % ophthalmic solution  Self No No   Sig: Administer 5 drops into ears 4 (four) times a day   Patient not taking: Reported on 10/30/2019   sildenafil (VIAGRA) 100 mg tablet  Self No No   Sig: Take 1 tablet (100 mg total) by mouth daily as needed for erectile dysfunction      Facility-Administered Medications: None Past Medical History:   Diagnosis Date    Diabetes mellitus ([address] )     History of throat surgery     Sleep apnea        Past Surgical History:   Procedure Laterality Date    KNEE SURGERY         Family History   Problem Relation Age of Onset    No Known Problems Mother     No Known Problems Father      I have reviewed and agree with the history as documented  Social History     Tobacco Use    Smoking status: Current Every Day Smoker     Packs/day: 1 00     Types: Cigarettes    Smokeless tobacco: Never Used   Substance Use Topics    Alcohol use: Yes     Frequency: 4 or more times a week     Drinks per session: 5 or 6     Binge frequency: Monthly     Comment: SOCIAL    Drug use: No        Review of Systems   Constitutional: Negative  Negative for chills and fever  HENT: Negative  Negative for trouble swallowing and voice change  Eyes: Negative for photophobia and visual disturbance  Respiratory: Negative  Negative for cough and shortness of breath  Cardiovascular: Negative  Negative for chest pain and palpitations  Gastrointestinal: Negative  Negative for abdominal pain and vomiting  Endocrine: Negative  Negative for polydipsia and polyuria  Genitourinary: Negative  Negative for difficulty urinating and urgency  Musculoskeletal: Negative  Negative for back pain and neck pain  Skin: Negative  Negative for rash and wound  Allergic/Immunologic: Negative for immunocompromised state  Neurological: Negative  Negative for speech difficulty and weakness  Hematological: Negative  Negative for adenopathy  Does not bruise/bleed easily  All other systems reviewed and are negative  Physical Exam  Physical Exam   Constitutional: He is oriented to person, place, and time  He appears well-developed and well-nourished  HENT:   Mouth/Throat: Oropharynx is clear and moist and mucous membranes are normal    Voice normal   Eyes: Pupils are equal, round, and reactive to light  EOM are normal    Neck: Trachea normal and phonation normal        Cardiovascular: Normal rate and regular rhythm  Pulmonary/Chest: Effort normal    Abdominal: Soft  Bowel sounds are normal    Neurological: He is alert and oriented to person, place, and time  GCS eye subscore is 4  GCS verbal subscore is 5  GCS motor subscore is 6  Skin: Skin is warm and dry  Psychiatric: He has a normal mood and affect  His speech is normal and behavior is normal    Nursing note and vitals reviewed        Vital Signs  ED Triage Vitals [11/23/19 0159]   Temperature Pulse Respirations Blood Pressure SpO2   97 8 °F (36 6 °C) 92 18 136/84 96 %      Temp Source Heart Rate Source Patient Position - Orthostatic VS BP Location FiO2 (%)   Temporal Monitor Lying Left arm --      Pain Score       7           Vitals:    11/23/19 0159 11/23/19 0546   BP: 136/84 119/65   Pulse: 92 88   Patient Position - Orthostatic VS: Lying Lying         Visual Acuity      ED Medications  Medications   ketorolac (TORADOL) injection 15 mg (15 mg Intravenous Given 11/23/19 0310)   iohexol (OMNIPAQUE) 350 MG/ML injection (SINGLE-DOSE) 85 mL (85 mL Intravenous Given 11/23/19 0328)   amoxicillin-clavulanate (AUGMENTIN) 875-125 mg per tablet 1 tablet (1 tablet Oral Given 11/23/19 0544)       Diagnostic Studies  Results Reviewed     Procedure Component Value Units Date/Time    High sensitivity CRP [[id number]] Collected:  11/23/19 0231    Lab Status:  Final result Specimen:  Blood from Arm, Right Updated:  11/23/19 1306     CRP, High Sensitivity 30 00 mg/L     Narrative:             HsCRP Level       Relative Risk           <1 0 mg/L          Low           1 0 to 3 0 mg/L    Average           >3 0 mg/L          High        Basic metabolic panel [[id number]]  (Abnormal) Collected:  11/23/19 0231    Lab Status:  Final result Specimen:  Blood from Arm, Right Updated:  11/23/19 0247     Sodium 136 mmol/L      Potassium 4 6 mmol/L      Chloride 98 mmol/L      CO2 28 mmol/L      ANION GAP 10 mmol/L      BUN 13 mg/dL      Creatinine 0 90 mg/dL      Glucose 159 mg/dL      Calcium 9 2 mg/dL      eGFR 108 ml/min/1 73sq m     Narrative:       [place] guidelines for Chronic Kidney Disease (CKD):     Stage 1 with normal or high GFR (GFR > 90 mL/min/1 73 square meters)    Stage 2 Mild CKD (GFR = 60-89 mL/min/1 73 square meters)    Stage 3A Moderate CKD (GFR = 45-59 mL/min/1 73 square meters)    Stage 3B Moderate CKD (GFR = 30-44 mL/min/1 73 square meters)    Stage 4 Severe CKD (GFR = 15-29 mL/min/1 73 square meters)    Stage 5 End Stage CKD (GFR <15 mL/min/1 73 square meters)  Note: GFR calculation is accurate only with a steady state creatinine    Magnesium [[id number]]  (Normal) Collected:  11/23/19 0231    Lab Status:  Final result Specimen:  Blood from Arm, Right Updated:  11/23/19 0247     Magnesium 1 9 mg/dL     CBC and differential [[id number]]  (Abnormal) Collected:  11/23/19 0231    Lab Status:  Final result Specimen:  Blood from Arm, Right Updated:  11/23/19 0237     WBC 11 19 Thousand/uL      RBC 5 11 Million/uL      Hemoglobin 16 4 g/dL      Hematocrit 48 0 %      MCV 94 fL      MCH 32 1 pg      MCHC 34 2 g/dL      RDW 11 9 %      MPV 9 2 fL      Platelets 889 Thousands/uL      nRBC 0 /100 WBCs      Neutrophils Relative 52 %      Immat GRANS % 1 %      Lymphocytes Relative 37 %      Monocytes Relative 8 %      Eosinophils Relative 2 %      Basophils Relative 0 %      Neutrophils Absolute 5 94 Thousands/µL      Immature Grans Absolute 0 06 Thousand/uL      Lymphocytes Absolute 4 11 Thousands/µL      Monocytes Absolute 0 87 Thousand/µL      Eosinophils Absolute 0 17 Thousand/µL      Basophils Absolute 0 04 Thousands/µL                  CT soft tissue neck with contrast   Final Result by Adriano Awan MD (11/23 4759)      Reidentified postoperative changes of recent uvulopalatopharyngoplasty    When compared to the prior study, there appears to be a fistulous tract extending from the posterior base of the tongue, to the right of midline, extending caudally and anteriorly    through the geniohyoid muscle and subcutaneous soft tissues anterior to the hyoid bone and thyroid cartilage, to the skin surface  Clinical correlation is recommended  Workstation performed: [id number]                    Procedures  Procedures       ED Course                               MDM  Number of Diagnoses or Management Options  Fistula, postoperative, ENT surgery: new and requires workup     Amount and/or Complexity of Data Reviewed  Tests in the radiology section of CPT®: ordered and reviewed  Tests in the medicine section of CPT®: ordered and reviewed  Decide to obtain previous medical records or to obtain history from someone other than the patient: yes    Risk of Complications, Morbidity, and/or Mortality  Presenting problems: high  Diagnostic procedures: high    Patient Progress  Patient progress: stable      Disposition  Final diagnoses:   Fistula, postoperative, ENT surgery     Time reflects when diagnosis was documented in both MDM as applicable and the Disposition within this note     Time User Action Codes Description Comment    11/23/2019  5:41 AM Baldemar Dooley Add [L98 8] Fistula     11/23/2019  5:42 AM Rigo Campos Modify [L98 8] Fistula, postoperative, ENT surgery       ED Disposition     ED Disposition Condition Date/Time Comment    Discharge Stable Sat Nov 23, 2019  5:41 AM Israel Tapia discharge to home/self care  Follow-up Information     Follow up With Specialties Details Why Contact Info    Dr Bharati Joshi  Call today WITHOUT FAIL, Tell about this ER visit   following up  11/13 uvulopalatopharyngoplasty [facility]   Dr Dahlia Saavedra          Discharge Medication List as of 11/23/2019  5:48 AM      START taking these medications    Details   amoxicillin-clavulanate (AUGMENTIN) 875-125 mg per tablet Take 1 tablet by mouth every 12 (twelve) hours for 10 days, Starting Sat 11/23/2019, Until Tue 12/3/2019, Normal         CONTINUE these medications which have NOT CHANGED    Details   acetaminophen (TYLENOL) 325 mg tablet Take 2 tablets (650 mg total) by mouth every 6 (six) hours as needed (pain, fever), Starting Mon 8/5/2019, Print      dexamethasone (DECADRON) 2 mg tablet Take with food  Take one time dose of 10mg by mouth tomorrow, Normal      HYDROcodone-acetaminophen (NORCO) 5-325 mg per tablet Take 1 tablet by mouth every 6 (six) hours as needed for pain for up to 20 dosesMax Daily Amount: 4 tablets, Starting Sun 11/17/2019, Normal      metFORMIN (GLUCOPHAGE) 500 mg tablet Take 1 tablet (500 mg total) by mouth daily, Starting Mon 5/13/2019, Normal      ofloxacin (OCUFLOX) 0 3 % ophthalmic solution Administer 5 drops into ears 4 (four) times a day, Starting Mon 8/5/2019, Print      sildenafil (VIAGRA) 100 mg tablet Take 1 tablet (100 mg total) by mouth daily as needed for erectile dysfunction, Starting Wed 10/2/2019, Normal           No discharge procedures on file      ED Provider  Electronically Signed by           Pantera Copeland MD  11/28/19 1310

## 2019-11-25 ENCOUNTER — APPOINTMENT (OUTPATIENT)
Dept: OTOLARYNGOLOGY | Facility: CLINIC | Age: 39
End: 2019-11-25
Payer: COMMERCIAL

## 2019-11-25 VITALS
DIASTOLIC BLOOD PRESSURE: 86 MMHG | HEART RATE: 89 BPM | SYSTOLIC BLOOD PRESSURE: 128 MMHG | TEMPERATURE: 98.1 F | OXYGEN SATURATION: 97 %

## 2019-11-25 DIAGNOSIS — S11.90XA UNSPECIFIED OPEN WOUND OF UNSPECIFIED PART OF NECK, INITIAL ENCOUNTER: ICD-10-CM

## 2019-11-25 PROCEDURE — 10060 I&D ABSCESS SIMPLE/SINGLE: CPT | Mod: 58

## 2019-11-25 PROCEDURE — 99024 POSTOP FOLLOW-UP VISIT: CPT

## 2019-11-25 RX ORDER — ACETAMINOPHEN AND CODEINE 300; 30 MG/1; MG/1
300-30 TABLET ORAL
Qty: 30 | Refills: 0 | Status: COMPLETED | COMMUNITY
Start: 2019-11-12 | End: 2019-11-25

## 2019-11-25 RX ORDER — AMOXICILLIN 500 MG/1
500 CAPSULE ORAL
Qty: 14 | Refills: 0 | Status: COMPLETED | COMMUNITY
Start: 2019-11-12 | End: 2019-11-25

## 2019-11-25 RX ORDER — SULFAMETHOXAZOLE AND TRIMETHOPRIM 800; 160 MG/1; MG/1
800-160 TABLET ORAL TWICE DAILY
Qty: 10 | Refills: 0 | Status: ACTIVE | COMMUNITY
Start: 2019-11-25 | End: 1900-01-01

## 2019-11-25 NOTE — PROCEDURE
[FreeTextEntry1] :  Incision and drainage of submental surgical site.  Wound care [FreeTextEntry3] :  Incision and drainage of submental surgical site.  Wound care

## 2019-11-25 NOTE — PHYSICAL EXAM
[Normal] : no rashes [de-identified] :   Incision and drainage of submental surgical site.  Wound care

## 2019-11-25 NOTE — HISTORY OF PRESENT ILLNESS
[de-identified] : 39 years old male patient with history of Status post Robotic Assisted  UPPP, Hyoid suspension, Lingual Tonsils Ablation.   Patient is present today in the office with history of  two visits to Emergency visit status post surgical intervention.  Oral care was performed today.  Incision and drainage of submental surgical site.  Wound care

## 2019-11-25 NOTE — REASON FOR VISIT
[Post-Operative Visit] : a post-operative visit [FreeTextEntry2] : Status post Robotic Assisted  UPPP, Hyoid suspension, Lingual Tonsils Ablation.

## 2019-11-27 ENCOUNTER — APPOINTMENT (OUTPATIENT)
Dept: OTOLARYNGOLOGY | Facility: CLINIC | Age: 39
End: 2019-11-27
Payer: COMMERCIAL

## 2019-11-27 VITALS
RESPIRATION RATE: 15 BRPM | SYSTOLIC BLOOD PRESSURE: 140 MMHG | TEMPERATURE: 97.8 F | HEART RATE: 105 BPM | OXYGEN SATURATION: 96 % | DIASTOLIC BLOOD PRESSURE: 90 MMHG

## 2019-11-27 DIAGNOSIS — G47.19 OTHER HYPERSOMNIA: ICD-10-CM

## 2019-11-27 DIAGNOSIS — J03.90 ACUTE TONSILLITIS, UNSPECIFIED: ICD-10-CM

## 2019-11-27 DIAGNOSIS — G47.33 OBSTRUCTIVE SLEEP APNEA (ADULT) (PEDIATRIC): ICD-10-CM

## 2019-11-27 DIAGNOSIS — J35.3 HYPERTROPHY OF TONSILS WITH HYPERTROPHY OF ADENOIDS: ICD-10-CM

## 2019-11-27 DIAGNOSIS — J34.3 HYPERTROPHY OF NASAL TURBINATES: ICD-10-CM

## 2019-11-27 PROCEDURE — 99024 POSTOP FOLLOW-UP VISIT: CPT

## 2019-11-27 NOTE — HISTORY OF PRESENT ILLNESS
[de-identified] : 39 years old male patient with history of Status post Robotic Assisted  UPPP, Hyoid suspension, Lingual Tonsils Ablation.   Patient is present today in the office  for Oral care and  Wound care.  Submental wound is healing well and patient is asymptomatic

## 2019-11-27 NOTE — PHYSICAL EXAM
[FreeTextEntry1] : Obese, smoker DM and HBP [de-identified] :   Incision and drainage of submental surgical site.  Wound care [Normal] : tympanic membranes are normal in both ears [] : septum deviated bilaterally [de-identified] : large [de-identified] : Large obstructing

## 2019-12-03 LAB
BACTERIA FLD CULT: ABNORMAL
Lab: NORMAL
Lab: NORMAL
MISCELLANEOUS LAB TEST RESULT: NORMAL

## 2019-12-16 ENCOUNTER — APPOINTMENT (OUTPATIENT)
Dept: OTOLARYNGOLOGY | Facility: CLINIC | Age: 39
End: 2019-12-16

## 2019-12-26 ENCOUNTER — TELEPHONE (OUTPATIENT)
Dept: UROLOGY | Facility: MEDICAL CENTER | Age: 39
End: 2019-12-26

## 2019-12-26 ENCOUNTER — PROCEDURE VISIT (OUTPATIENT)
Dept: UROLOGY | Facility: MEDICAL CENTER | Age: 39
End: 2019-12-26
Payer: COMMERCIAL

## 2019-12-26 VITALS — BODY MASS INDEX: 37.64 KG/M2 | HEIGHT: 73 IN | WEIGHT: 284 LBS

## 2019-12-26 DIAGNOSIS — E29.1 HYPOGONADISM IN MALE: Primary | ICD-10-CM

## 2019-12-26 PROCEDURE — 11980 IMPLANT HORMONE PELLET(S): CPT | Performed by: UROLOGY

## 2019-12-26 PROCEDURE — S0189 TESTOSTERONE PELLET 75 MG: HCPCS | Performed by: UROLOGY

## 2019-12-26 NOTE — PROGRESS NOTES
Subq hormone pellet implantation     Date/Time 12/26/2019 12:21 PM     Performed by  Vishal Anne MD     Authorized by Vishal Anne MD      Universal Protocol Consent: Verbal consent obtained  Written consent obtained  Risks and benefits: risks, benefits and alternatives were discussed  Consent given by: patient  Patient understanding: patient states understanding of the procedure being performed  Patient consent: the patient's understanding of the procedure matches consent given  Procedure consent: procedure consent matches procedure scheduled  Patient identity confirmed: verbally with patient  Time out: Immediately prior to procedure a "time out" was called to verify the correct patient, procedure, equipment, support staff and site/side marked as required  Preparation: Patient was prepped and draped in the usual sterile fashion  Site preparation: Betadine    Local anesthesia used: yes      Anesthesia: local infiltration     Anesthesia   Local anesthesia used: yes  Local Anesthetic: lidocaine 2% without epinephrine  Anesthetic total: 10 mL     Sedation   Patient sedated: no        Specimen: no    Culture: no   Procedure Details   Procedure Notes: The buttocks was prepped with Betadine and then 2% lidocaine lubricant used to anesthetize the skin and subcutaneous tissues in an inverted V pattern  After adequate anesthesia 11 scalpel blade was used to make a stab incision in the buttocks and using the subcutaneous needle delivery system a needle and trocar was inserted in the subcu plane through the stab incision and 1st 5 and then after another pass with the trocar another 5 test the PAL implants were placed without difficulty or significant bleeding  Sterile dressings were applied and the patient left having tolerated the procedure well and in good condition  There were no complications    Patient Transportation: confirmed  Patient tolerance: Patient tolerated the procedure well with no immediate complications

## 2019-12-26 NOTE — LETTER
December 26, 2019     Negro Ruffin DO  [address] [facility] [address]    Patient: Hussein Quezada   YOB: 1980   Date of Visit: 12/26/2019       Dear Dr Leo De Jesus: Thank you for referring Hussein Quezada to me for evaluation  Below are my notes for this consultation  If you have questions, please do not hesitate to call me  I look forward to following your patient along with you  Sincerely,        Amanda Sexton MD        CC: No Recipients  Amanda Sexton MD  12/26/2019 12:23 PM  Sign at close encounter        Subq hormone pellet implantation     Date/Time 12/26/2019 12:21 PM     Performed by  Amanda Sexton MD     Authorized by Amanda Sexton MD      Universal Protocol Consent: Verbal consent obtained  Written consent obtained  Risks and benefits: risks, benefits and alternatives were discussed  Consent given by: patient  Patient understanding: patient states understanding of the procedure being performed  Patient consent: the patient's understanding of the procedure matches consent given  Procedure consent: procedure consent matches procedure scheduled  Patient identity confirmed: verbally with patient  Time out: Immediately prior to procedure a "time out" was called to verify the correct patient, procedure, equipment, support staff and site/side marked as required  Preparation: Patient was prepped and draped in the usual sterile fashion  Site preparation: Betadine    Local anesthesia used: yes      Anesthesia: local infiltration     Anesthesia   Local anesthesia used: yes  Local Anesthetic: lidocaine 2% without epinephrine  Anesthetic total: 10 mL     Sedation   Patient sedated: no        Specimen: no    Culture: no   Procedure Details   Procedure Notes: The buttocks was prepped with Betadine and then 2% lidocaine lubricant used to anesthetize the skin and subcutaneous tissues in an inverted V pattern    After adequate anesthesia 11 scalpel blade was used to make a stab incision in the buttocks and using the subcutaneous needle delivery system a needle and trocar was inserted in the subcu plane through the stab incision and 1st 5 and then after another pass with the trocar another 5 test the PAL implants were placed without difficulty or significant bleeding  Sterile dressings were applied and the patient left having tolerated the procedure well and in good condition  There were no complications    Patient Transportation: confirmed  Patient tolerance: Patient tolerated the procedure well with no immediate complications

## 2020-02-06 ENCOUNTER — TELEPHONE (OUTPATIENT)
Dept: UROLOGY | Facility: MEDICAL CENTER | Age: 40
End: 2020-02-06

## 2020-02-06 NOTE — TELEPHONE ENCOUNTER
Patient of Dr Zhang Machado managed at the [facility] office  History of male hypogonadism  Last seen in the office 12/26/2019 for Testopel implant  Call placed to patient to obtain more information in regards to letter needed for coverage of Testopel implant  Message left on answering machine for patient to call office back  Office number provided

## 2020-02-06 NOTE — TELEPHONE ENCOUNTER
MNF Requested    Patient is calling because he had a procedure done on 12/26/19  Patient insurance denied claim at the moment until they receive a letter from our office  Please assist I believe patient needs a medical necessity form

## 2020-02-10 NOTE — TELEPHONE ENCOUNTER
2nd attempt to contact patient  Unable to leave message on patient's answering machine as he is not accepting messages at this time  No alternative number listed in chart       **Detailed information is needed from patient as to what exactly is needed from insurance company from our office in regards to  denial for office visit 12/26/2019**

## 2020-02-11 NOTE — TELEPHONE ENCOUNTER
Spoke with patient - he needs a letter sent to his insurance company explaining why he needs the Testopel  He has a low testosterone level  He notes that he didn't want to inject himself every other week and he has two young children at home and using the gel was out of the question  Letter needs to be sent to [facility]  Patient didn't have any other this information  He stated just send it to where the claims are sent to

## 2020-02-12 NOTE — TELEPHONE ENCOUNTER
Letter completed  Routed to billing dept to see if they would have the info where to send it, copy mailed to pt

## 2020-02-19 NOTE — TELEPHONE ENCOUNTER
Patient returning call  Patient requesting letter and any medical records to be sent all together along with patient medical number  Please send to email [email or web address]  [email or web address]

## 2020-02-19 NOTE — TELEPHONE ENCOUNTER
Patient will call back with address  Advised if we send it to same place bill goes to it might not be approved  As per Denia Connors there is a exact address and patient should reach out to insurance to verify address  Patient will call back

## 2020-02-19 NOTE — TELEPHONE ENCOUNTER
Requested ,medical records, medical necessity letter, demographic sheet with [address] ID number were emailed to [email or web address]

## 2020-02-22 ENCOUNTER — TRANSCRIBE ORDERS (OUTPATIENT)
Dept: ADMINISTRATIVE | Facility: HOSPITAL | Age: 40
End: 2020-02-22

## 2020-02-22 DIAGNOSIS — M23.91 KNEE DERANGEMENT SYNDROME, RIGHT: ICD-10-CM

## 2020-02-22 DIAGNOSIS — M23.92 DERANGEMENT OF LEFT KNEE: Primary | ICD-10-CM

## 2020-02-27 NOTE — TELEPHONE ENCOUNTER
Patient states insurance has not received paperwork and is requesting if we could email them paperwork again to [email or web address]

## 2020-03-02 NOTE — TELEPHONE ENCOUNTER
Please do not use email address records not coming thru please fax to [phone]  Include members Insurance ID #

## 2020-03-02 NOTE — TELEPHONE ENCOUNTER
Per patient request Medical Records have now been faxed to  Hernando Jenkins at [place] at fax number [phone]

## 2020-03-02 NOTE — TELEPHONE ENCOUNTER
FYI - NO ACTION REQUIRED      Patient calling again  Confirmed the email address is [email or web address]  Patient contacting her and requesting her to call us as she has no phone number listed      Patient can be reached at [phone]

## 2020-06-24 ENCOUNTER — TELEPHONE (OUTPATIENT)
Dept: UROLOGY | Facility: MEDICAL CENTER | Age: 40
End: 2020-06-24

## 2023-04-24 NOTE — ASU PATIENT PROFILE, ADULT - NS SC CAGE ALCOHOL EYE OPENER
4/24/23 Addendum:  Called and spoke with Rica.  Let her know [facility] will see Sha on 5/1/23 to check his INR.  Sha will continue his maintenance dose of warfarin.  Rica verbalizes an understanding of this.  Danya Edwards RN     no

## 2024-01-04 NOTE — PROGRESS NOTES
Follow-Up Note - [address] [place] [address]  41 y o  male  DJP:[birth year]  RWK:[id number]    CC: I saw this patient for follow-up in clinic today for his Sleep Disordered Breathing, Coexisting Sleep and Medical Problems  PFSH, Problem List, Medications & Allergies were reviewed in EMR  Interval changes: none reported  He  has a past medical history of Diabetes mellitus ([address] ) and Sleep apnea  He currently has no medications in their medication list     ROS: Reviewed (see attached)  Significant for medical conditions are stable        SUBJECTIVE: Fabiana Mathews reports some difficulty tolerating PAP; With respect to compliance, data download shows:  using PAP > 4 hours/night 58% of the time  DARRIN (estimated) 3 5/hour at  pressure of 17cm H2O  Because of work commitments, there days when he does not get home and does not carry his CPAP machine with him  On days used, he is managing almost 5 hr per night on CPAP  · He is experiencing some adverse effects: dry mouth  · He is benefitting from use and reports: sleeping better and "life changing"   Sleep Routine: He reports getting 5-6 hours sleep  ; he has no difficulty initiating or maintaining sleep   He awakens with the aid of an alarm feeling refreshed  He denied excessive drowsiness   He rated himself at Total score: 3 /24 on the [place] sleepiness scale  Habits: reports that he has been smoking Cigarettes  He has been smoking about 0 25 packs per day  He has never used smokeless tobacco ,  reports that he does not drink alcohol ,  reports that he does not use drugs  , Caffeine use: limited , Exercise routine: none   OBJECTIVE: /80   Ht 6' 1" (1 854 m)   Wt 131 kg (289 lb 12 8 oz)   SpO2 96%   BMI 38 23 kg/m²     Patient is well groomed; well appearing; no deformity  Psych:He cooperative and in no distress  Mental state appears normal CNS: Alert, orientated, clear & coherent speech  Skin/Extrem: warm & dry; col & hydration normal; no edema  The patient's goals for the shift include      The clinical goals for the shift include pt will improve with CIWA this shift      Problem: Fall/Injury  Goal: Not fall by end of shift  Outcome: Progressing  Goal: Be free from injury by end of the shift  Outcome: Progressing  Goal: Verbalize understanding of personal risk factors for fall in the hospital  Outcome: Progressing  Goal: Verbalize understanding of risk factor reduction measures to prevent injury from fall in the home  Outcome: Progressing  Goal: Use assistive devices by end of the shift  Outcome: Progressing  Goal: Pace activities to prevent fatigue by end of the shift  Outcome: Progressing     Problem: Diabetes  Goal: Achieve decreasing blood glucose levels by end of shift  Outcome: Progressing  Goal: Increase stability of blood glucose readings by end of shift  Outcome: Progressing  Goal: Decrease in ketones present in urine by end of shift  Outcome: Progressing  Goal: Maintain electrolyte levels within acceptable range throughout shift  Outcome: Progressing  Goal: Maintain glucose levels >70mg/dl to <250mg/dl throughout shift  Outcome: Progressing  Goal: No changes in neurological exam by end of shift  Outcome: Progressing  Goal: Learn about and adhere to nutrition recommendations by end of shift  Outcome: Progressing  Goal: Vital signs within normal range for age by end of shift  Outcome: Progressing  Goal: Increase self care and/or family involovement by end of shift  Outcome: Progressing  Goal: Receive DSME education by end of shift  Outcome: Progressing     Problem: Pain - Adult  Goal: Verbalizes/displays adequate comfort level or baseline comfort level  Outcome: Progressing     Problem: Safety - Adult  Goal: Free from fall injury  Outcome: Progressing     Problem: Discharge Planning  Goal: Discharge to home or other facility with appropriate resources  Outcome: Progressing        H&N: EOMI; NC/AT - There are no facial pressure marks, no rashes  Neck Circumference: 48 cm  ENMT Mucus membranes appeared normal  Nasal airway is patent  Oral airway is crowded  Resp:effort is normal CVS: RRR ABD:truncal obesity MSK:Gait normal     ASSESSMENT: Primary Sleep/Secondary(to Medical or Psych conditions) & comorbidities   1  Obstructive sleep apnea syndrome  PAP DME Resupply/Reorder   2  Insufficient sleep syndrome     3  Hypersomnia      Improved   4  Tobacco abuse     5  Type 2 diabetes mellitus without complication, without long-term current use of insulin ([address] )     6  Mood disturbance     7  Obesity (BMI 30-39 9)     8  Erectile dysfunction, unspecified erectile dysfunction type         PLAN:  1  Treatment with  PAP is medically necessary and [place] is agreable to continue use  2   Although his he compliance is less than 70% of the time, he is certainly benefitting from use  3  Care of equipment, methods to improve comfort using PAP and importance of compliance with therapy were discussed  4  Pressure settin cm H2O     5  Rx provided to replace supplies and Care coordinated with DME provider  6  Strategies for weight reduction were discussed  7  I advised on strategies to improve dryness  Specifically, adequate treatment of nasal allergies, using Biotene mouthwash &/or Xylimelt  8  I advise smoking cessation and he is planning to stop  9  I also advised allowing sufficient opportunity for sleep  10  Follow-up is advised in 1 year or sooner if needed to monitor progress, compliance and to adjust therapy  Thank you for allowing me to participate in the care of this patient      Sincerely,    Authenticated electronically by Azucena Brown MD on    Board Certified Specialist

## 2025-05-29 ENCOUNTER — TRANSCRIPTION ENCOUNTER (OUTPATIENT)
Age: 45
End: 2025-05-29